# Patient Record
Sex: MALE | HISPANIC OR LATINO | URBAN - METROPOLITAN AREA
[De-identification: names, ages, dates, MRNs, and addresses within clinical notes are randomized per-mention and may not be internally consistent; named-entity substitution may affect disease eponyms.]

---

## 2023-09-16 ENCOUNTER — OFFICE VISIT (OUTPATIENT)
Dept: URGENT CARE | Facility: URGENT CARE | Age: 28
End: 2023-09-16
Payer: MEDICAID

## 2023-09-16 VITALS
TEMPERATURE: 98.8 F | WEIGHT: 129 LBS | HEART RATE: 125 BPM | OXYGEN SATURATION: 100 % | DIASTOLIC BLOOD PRESSURE: 86 MMHG | SYSTOLIC BLOOD PRESSURE: 145 MMHG

## 2023-09-16 DIAGNOSIS — R30.0 DYSURIA: Primary | ICD-10-CM

## 2023-09-16 DIAGNOSIS — K29.00 ACUTE GASTRITIS WITHOUT HEMORRHAGE, UNSPECIFIED GASTRITIS TYPE: ICD-10-CM

## 2023-09-16 LAB
ALBUMIN UR-MCNC: NEGATIVE MG/DL
APPEARANCE UR: CLEAR
BILIRUB UR QL STRIP: NEGATIVE
COLOR UR AUTO: YELLOW
GLUCOSE UR STRIP-MCNC: NEGATIVE MG/DL
HGB UR QL STRIP: NEGATIVE
KETONES UR STRIP-MCNC: NEGATIVE MG/DL
LEUKOCYTE ESTERASE UR QL STRIP: NEGATIVE
NITRATE UR QL: NEGATIVE
PH UR STRIP: 6.5 [PH] (ref 5–7)
SP GR UR STRIP: 1.01 (ref 1–1.03)
UROBILINOGEN UR STRIP-ACNC: 0.2 E.U./DL

## 2023-09-16 PROCEDURE — 81003 URINALYSIS AUTO W/O SCOPE: CPT

## 2023-09-16 PROCEDURE — 99203 OFFICE O/P NEW LOW 30 MIN: CPT

## 2023-09-16 NOTE — PROGRESS NOTES
Assessment & Plan     Acute gastritis without hemorrhage, unspecified gastritis type    - omeprazole (PRILOSEC) 20 MG DR capsule; Take 1 capsule (20 mg) by mouth 2 times daily for 30 days    Recommend trial of omeprazole to treat reflux sx.  Recommend avoiding spicy foods until this subsides.  Recommend establishing care with PCP.    Close Follow-up if no change or new or worsening sx prn.    Dysuria    - UA Macroscopic with reflex to Microscopic and Culture    Reassured UA was negative.    Shanice Almeida MD   Urgent Care Provider  [facility] URGENT CARE TIKI Ortiz is a 28 year old, presenting for the following health issues:  Urgent Care and Abdominal Pain (Abdominal pain, back pain that comes and goes. Shortness of breath and some chest pain. Hands cold and xavier.)      HPI     Presents with boyfriend.  Recently moved here from [place].    No PCP yet.  Increased midepigastric pain with spicy foods in past weeks.  Has had more burping with +acid taste left in mouth.  Has not tried anything to resolve this.  Has had some back pain that comes and goes.  No fever or chills.  No N/V.  No unexplained weight loss.  No cough.    Review of Systems   Constitutional, HEENT, cardiovascular, pulmonary, GI, , musculoskeletal, neuro, skin, endocrine and psych systems are negative, except as otherwise noted.      Objective    BP (!) 145/86   Pulse (!) 125   Temp 98.8  F (37.1  C) (Tympanic)   Wt 58.5 kg (129 lb)   SpO2 100%   There is no height or weight on file to calculate BMI.  Physical Exam   GENERAL: healthy, alert and no distress  EYES: Eyes grossly normal to inspection, PERRL and conjunctivae and sclerae normal  RESP: lungs clear to auscultation - no rales, rhonchi or wheezes  CV: regular rate and rhythm, normal S1 S2, no S3 or S4, no murmur, click or rub, no peripheral edema and peripheral pulses strong  ABDOMEN: soft, nontender, no hepatosplenomegaly, no masses and bowel sounds  normal  PSYCH: mentation appears normal, affect normal/bright

## 2023-09-18 ENCOUNTER — TELEPHONE (OUTPATIENT)
Dept: FAMILY MEDICINE | Facility: CLINIC | Age: 28
End: 2023-09-18

## 2023-09-18 ENCOUNTER — OFFICE VISIT (OUTPATIENT)
Dept: FAMILY MEDICINE | Facility: CLINIC | Age: 28
End: 2023-09-18
Payer: MEDICAID

## 2023-09-18 VITALS
SYSTOLIC BLOOD PRESSURE: 138 MMHG | HEIGHT: 63 IN | DIASTOLIC BLOOD PRESSURE: 82 MMHG | TEMPERATURE: 98.2 F | HEART RATE: 98 BPM | WEIGHT: 128 LBS | RESPIRATION RATE: 16 BRPM | BODY MASS INDEX: 22.68 KG/M2 | OXYGEN SATURATION: 100 %

## 2023-09-18 DIAGNOSIS — Z11.3 ROUTINE SCREENING FOR STI (SEXUALLY TRANSMITTED INFECTION): ICD-10-CM

## 2023-09-18 DIAGNOSIS — Z21 POSITIVE LABORATORY TESTING FOR HUMAN IMMUNODEFICIENCY VIRUS (H): Primary | ICD-10-CM

## 2023-09-18 DIAGNOSIS — Z13.220 LIPID SCREENING: ICD-10-CM

## 2023-09-18 DIAGNOSIS — F41.1 GAD (GENERALIZED ANXIETY DISORDER): Primary | ICD-10-CM

## 2023-09-18 DIAGNOSIS — F41.9 ANXIETY: ICD-10-CM

## 2023-09-18 DIAGNOSIS — F19.91 HISTORY OF DRUG USE: ICD-10-CM

## 2023-09-18 DIAGNOSIS — Z13.29 SCREENING FOR THYROID DISORDER: ICD-10-CM

## 2023-09-18 DIAGNOSIS — M99.09 SOMATIC DYSFUNCTION OF ABDOMINAL REGION: ICD-10-CM

## 2023-09-18 LAB
ALBUMIN SERPL BCG-MCNC: 4.9 G/DL (ref 3.5–5.2)
ALP SERPL-CCNC: 63 U/L (ref 40–129)
ALT SERPL W P-5'-P-CCNC: 18 U/L (ref 0–70)
ANION GAP SERPL CALCULATED.3IONS-SCNC: 13 MMOL/L (ref 7–15)
AST SERPL W P-5'-P-CCNC: 20 U/L (ref 0–45)
BASOPHILS # BLD AUTO: 0 10E3/UL (ref 0–0.2)
BASOPHILS NFR BLD AUTO: 0 %
BILIRUB SERPL-MCNC: 0.7 MG/DL
BUN SERPL-MCNC: 7.4 MG/DL (ref 6–20)
CALCIUM SERPL-MCNC: 9.8 MG/DL (ref 8.6–10)
CHLORIDE SERPL-SCNC: 105 MMOL/L (ref 98–107)
CHOLEST SERPL-MCNC: 141 MG/DL
CREAT SERPL-MCNC: 0.8 MG/DL (ref 0.67–1.17)
DEPRECATED HCO3 PLAS-SCNC: 22 MMOL/L (ref 22–29)
EGFRCR SERPLBLD CKD-EPI 2021: >90 ML/MIN/1.73M2
EOSINOPHIL # BLD AUTO: 0 10E3/UL (ref 0–0.7)
EOSINOPHIL NFR BLD AUTO: 0 %
ERYTHROCYTE [DISTWIDTH] IN BLOOD BY AUTOMATED COUNT: 12.6 % (ref 10–15)
GLUCOSE SERPL-MCNC: 104 MG/DL (ref 70–99)
HCT VFR BLD AUTO: 45.5 % (ref 40–53)
HDLC SERPL-MCNC: 40 MG/DL
HGB BLD-MCNC: 15.9 G/DL (ref 13.3–17.7)
IMM GRANULOCYTES # BLD: 0 10E3/UL
IMM GRANULOCYTES NFR BLD: 0 %
LDLC SERPL CALC-MCNC: 82 MG/DL
LIPASE SERPL-CCNC: 22 U/L (ref 13–60)
LYMPHOCYTES # BLD AUTO: 2.4 10E3/UL (ref 0.8–5.3)
LYMPHOCYTES NFR BLD AUTO: 44 %
MCH RBC QN AUTO: 30.5 PG (ref 26.5–33)
MCHC RBC AUTO-ENTMCNC: 34.9 G/DL (ref 31.5–36.5)
MCV RBC AUTO: 87 FL (ref 78–100)
MONOCYTES # BLD AUTO: 0.5 10E3/UL (ref 0–1.3)
MONOCYTES NFR BLD AUTO: 8 %
NEUTROPHILS # BLD AUTO: 2.7 10E3/UL (ref 1.6–8.3)
NEUTROPHILS NFR BLD AUTO: 48 %
NONHDLC SERPL-MCNC: 101 MG/DL
PLATELET # BLD AUTO: 187 10E3/UL (ref 150–450)
POTASSIUM SERPL-SCNC: 3.6 MMOL/L (ref 3.4–5.3)
PROT SERPL-MCNC: 9.1 G/DL (ref 6.4–8.3)
RBC # BLD AUTO: 5.21 10E6/UL (ref 4.4–5.9)
SODIUM SERPL-SCNC: 140 MMOL/L (ref 136–145)
TRIGL SERPL-MCNC: 95 MG/DL
TSH SERPL DL<=0.005 MIU/L-ACNC: 1.39 UIU/ML (ref 0.3–4.2)
WBC # BLD AUTO: 5.6 10E3/UL (ref 4–11)

## 2023-09-18 PROCEDURE — 83690 ASSAY OF LIPASE: CPT | Performed by: FAMILY MEDICINE

## 2023-09-18 PROCEDURE — 87491 CHLMYD TRACH DNA AMP PROBE: CPT | Performed by: FAMILY MEDICINE

## 2023-09-18 PROCEDURE — 86803 HEPATITIS C AB TEST: CPT | Performed by: FAMILY MEDICINE

## 2023-09-18 PROCEDURE — 36415 COLL VENOUS BLD VENIPUNCTURE: CPT | Performed by: FAMILY MEDICINE

## 2023-09-18 PROCEDURE — 80053 COMPREHEN METABOLIC PANEL: CPT | Performed by: FAMILY MEDICINE

## 2023-09-18 PROCEDURE — 80061 LIPID PANEL: CPT | Performed by: FAMILY MEDICINE

## 2023-09-18 PROCEDURE — 86780 TREPONEMA PALLIDUM: CPT | Performed by: FAMILY MEDICINE

## 2023-09-18 PROCEDURE — 99215 OFFICE O/P EST HI 40 MIN: CPT | Performed by: FAMILY MEDICINE

## 2023-09-18 PROCEDURE — 85025 COMPLETE CBC W/AUTO DIFF WBC: CPT | Performed by: FAMILY MEDICINE

## 2023-09-18 PROCEDURE — 84443 ASSAY THYROID STIM HORMONE: CPT | Performed by: FAMILY MEDICINE

## 2023-09-18 PROCEDURE — 87591 N.GONORRHOEAE DNA AMP PROB: CPT | Performed by: FAMILY MEDICINE

## 2023-09-18 RX ORDER — BUSPIRONE HYDROCHLORIDE 10 MG/1
10 TABLET ORAL 2 TIMES DAILY
Qty: 60 TABLET | Refills: 1 | Status: SHIPPED | OUTPATIENT
Start: 2023-09-18 | End: 2023-10-31

## 2023-09-18 ASSESSMENT — PATIENT HEALTH QUESTIONNAIRE - PHQ9
5. POOR APPETITE OR OVEREATING: MORE THAN HALF THE DAYS
SUM OF ALL RESPONSES TO PHQ QUESTIONS 1-9: 15

## 2023-09-18 ASSESSMENT — PAIN SCALES - GENERAL: PAINLEVEL: NO PAIN (0)

## 2023-09-18 ASSESSMENT — ANXIETY QUESTIONNAIRES
6. BECOMING EASILY ANNOYED OR IRRITABLE: MORE THAN HALF THE DAYS
1. FEELING NERVOUS, ANXIOUS, OR ON EDGE: NEARLY EVERY DAY
5. BEING SO RESTLESS THAT IT IS HARD TO SIT STILL: MORE THAN HALF THE DAYS
IF YOU CHECKED OFF ANY PROBLEMS ON THIS QUESTIONNAIRE, HOW DIFFICULT HAVE THESE PROBLEMS MADE IT FOR YOU TO DO YOUR WORK, TAKE CARE OF THINGS AT HOME, OR GET ALONG WITH OTHER PEOPLE: SOMEWHAT DIFFICULT
2. NOT BEING ABLE TO STOP OR CONTROL WORRYING: MORE THAN HALF THE DAYS
3. WORRYING TOO MUCH ABOUT DIFFERENT THINGS: MORE THAN HALF THE DAYS
GAD7 TOTAL SCORE: 15
GAD7 TOTAL SCORE: 15
7. FEELING AFRAID AS IF SOMETHING AWFUL MIGHT HAPPEN: MORE THAN HALF THE DAYS

## 2023-09-18 NOTE — TELEPHONE ENCOUNTER
Davon Wilson, DO [facility] [employer]  Please change his physical appoint with me, he did want to establish care with me and schedulers were not able to find in person appoint        Copied from CC chart.    GALDINO Larson  [facility]

## 2023-09-18 NOTE — TELEPHONE ENCOUNTER
Called patient and left a detailed voicemail message that I was calling to help patient get scheduled his physical with Dr Wilson.  Told patient I would call him back tomorrow to try and schedule.    GALDINO Larson  [facility]

## 2023-09-18 NOTE — Clinical Note
Please change his physical appoint with me, he did want to establish care with me and schedulers were not able to find in person appoint

## 2023-09-18 NOTE — PROGRESS NOTES
1. HUGO (generalized anxiety disorder)  ED follow up , New patient to this clinic and provider.  History of anxiety since teenage years.  Patient has never been medicated and is afraid to be on a daily medication.  Strongly advised therapy.  Avoid any drug use.  He does well on a medication that he can use potentially short term.  We discussed risks benefits of BuSpar.  He is to start medication and follow-up in a month  - busPIRone (BUSPAR) 10 MG tablet; Take 1 tablet (10 mg) by mouth 2 times daily  Dispense: 60 tablet; Refill: 1  - CBC with platelets and differential; Future  - TSH with free T4 reflex; Future  - CBC with platelets and differential  - TSH with free T4 reflex    2. Anxiety  As above  - Adult Mental Health  Referral; Future  - busPIRone (BUSPAR) 10 MG tablet; Take 1 tablet (10 mg) by mouth 2 times daily  Dispense: 60 tablet; Refill: 1    3. Lipid screening    - Lipid panel reflex to direct LDL Fasting; Future  - Lipid panel reflex to direct LDL Fasting    4. Routine screening for STI (sexually transmitted infection)    - Treponema Abs w Reflex to RPR and Titer; Future  - Neisseria gonorrhoeae PCR; Future  - Chlamydia trachomatis PCR; Future  - Treponema Abs w Reflex to RPR and Titer  - Neisseria gonorrhoeae PCR  - Chlamydia trachomatis PCR    5. Screening for thyroid disorder    - TSH with free T4 reflex; Future  - TSH with free T4 reflex    6. Somatic dysfunction of abdominal region  Patient was seen in urgent care however no labs were drawn.  Today his symptoms are slightly better.  Continue PPI for now.  Check labs and follow-up in a month.  - CBC with platelets and differential; Future  - Hepatitis C Screen Reflex to HCV RNA Quant and Genotype; Future  - Comprehensive metabolic panel (BMP + Alb, Alk Phos, ALT, AST, Total. Bili, TP); Future  - Lipase; Future  - CBC with platelets and differential  - Hepatitis C Screen Reflex to HCV RNA Quant and Genotype  - Comprehensive metabolic panel  "(BMP + Alb, Alk Phos, ALT, AST, Total. Bili, TP)  - Lipase      Spent  60min greater than 50% of counseling and coordination of care for the conditions documented above.    Sameer Ortiz is a 28 year old, presenting for the following health issues:  Follow Up (Urgent care)      [facility]     ED/UC Followup:    Facility:  [place] urgent [facility]  Date of visit: 9/16/23  Reason for visit: dysuria  Current Status:   Feeling better as far as GI symptoms. Taking omeprazole  Head issue is ongoing and likely related to anxiety.   He states he was not fully honest with urgent care.   He tried a drug in June and it has been affecting him since.   Anxiety is High.   Abdominal pains triggered by anxiety.  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      Objective    /82   Pulse 98   Temp 98.2  F (36.8  C) (Oral)   Resp 16   Ht 1.6 m (5' 3\")   Wt 58.1 kg (128 lb)   SpO2 100%   BMI 22.67 kg/m    Body mass index is 22.67 kg/m .  Physical Exam   GENERAL: healthy, alert and no distress  EYES: Eyes grossly normal to inspection, PERRL and conjunctivae and sclerae normal  HENT: ear canals and TM's normal, nose and mouth without ulcers or lesions  NECK: no adenopathy, no asymmetry, masses, or scars and thyroid normal to palpation  RESP: lungs clear to auscultation - no rales, rhonchi or wheezes  CV: regular rate and rhythm, normal S1 S2, no S3 or S4, no murmur, click or rub, no peripheral edema and peripheral pulses strong  ABDOMEN: soft, nontender, no hepatosplenomegaly, no masses and bowel sounds normal  MS: no gross musculoskeletal defects noted, no edema              "

## 2023-09-18 NOTE — PATIENT INSTRUCTIONS
Therapist   Start buspar 1/2 stephanie 2 times a day   Follow up as planned in a month or so  Make appnormantn now for preventive visit and follow  Davon Wilson D.O.

## 2023-09-19 LAB
C TRACH DNA SPEC QL NAA+PROBE: NEGATIVE
N GONORRHOEA DNA SPEC QL NAA+PROBE: NEGATIVE
T PALLIDUM AB SER QL: NONREACTIVE

## 2023-09-19 NOTE — TELEPHONE ENCOUNTER
Called patient and left a detailed voicemail message that I was calling to help patient get scheduled his physical with Dr Wilson.  Told patient I would call him back tomorrow to try and schedule.    GALDINO Larson  [facility]

## 2023-09-20 LAB — HCV AB SERPL QL IA: NONREACTIVE

## 2023-10-10 ENCOUNTER — VIRTUAL VISIT (OUTPATIENT)
Dept: FAMILY MEDICINE | Facility: CLINIC | Age: 28
End: 2023-10-10
Payer: MEDICAID

## 2023-10-10 DIAGNOSIS — K29.00 ACUTE GASTRITIS WITHOUT HEMORRHAGE, UNSPECIFIED GASTRITIS TYPE: ICD-10-CM

## 2023-10-10 DIAGNOSIS — G47.00 INSOMNIA, UNSPECIFIED TYPE: ICD-10-CM

## 2023-10-10 DIAGNOSIS — Z11.4 SCREENING FOR HIV (HUMAN IMMUNODEFICIENCY VIRUS): ICD-10-CM

## 2023-10-10 DIAGNOSIS — F41.1 GAD (GENERALIZED ANXIETY DISORDER): Primary | ICD-10-CM

## 2023-10-10 DIAGNOSIS — R12 HEARTBURN: ICD-10-CM

## 2023-10-10 DIAGNOSIS — R73.9 ELEVATED BLOOD SUGAR: ICD-10-CM

## 2023-10-10 PROCEDURE — 99214 OFFICE O/P EST MOD 30 MIN: CPT | Mod: VID | Performed by: FAMILY MEDICINE

## 2023-10-10 RX ORDER — HYDROXYZINE PAMOATE 25 MG/1
25 CAPSULE ORAL 3 TIMES DAILY PRN
Qty: 30 CAPSULE | Refills: 1 | Status: SHIPPED | OUTPATIENT
Start: 2023-10-10 | End: 2023-10-31

## 2023-10-10 ASSESSMENT — ANXIETY QUESTIONNAIRES
IF YOU CHECKED OFF ANY PROBLEMS ON THIS QUESTIONNAIRE, HOW DIFFICULT HAVE THESE PROBLEMS MADE IT FOR YOU TO DO YOUR WORK, TAKE CARE OF THINGS AT HOME, OR GET ALONG WITH OTHER PEOPLE: SOMEWHAT DIFFICULT
5. BEING SO RESTLESS THAT IT IS HARD TO SIT STILL: SEVERAL DAYS
2. NOT BEING ABLE TO STOP OR CONTROL WORRYING: SEVERAL DAYS
GAD7 TOTAL SCORE: 7
GAD7 TOTAL SCORE: 7
3. WORRYING TOO MUCH ABOUT DIFFERENT THINGS: SEVERAL DAYS
1. FEELING NERVOUS, ANXIOUS, OR ON EDGE: SEVERAL DAYS
7. FEELING AFRAID AS IF SOMETHING AWFUL MIGHT HAPPEN: SEVERAL DAYS
4. TROUBLE RELAXING: SEVERAL DAYS
6. BECOMING EASILY ANNOYED OR IRRITABLE: SEVERAL DAYS

## 2023-10-10 ASSESSMENT — PATIENT HEALTH QUESTIONNAIRE - PHQ9
10. IF YOU CHECKED OFF ANY PROBLEMS, HOW DIFFICULT HAVE THESE PROBLEMS MADE IT FOR YOU TO DO YOUR WORK, TAKE CARE OF THINGS AT HOME, OR GET ALONG WITH OTHER PEOPLE: SOMEWHAT DIFFICULT
SUM OF ALL RESPONSES TO PHQ QUESTIONS 1-9: 7
SUM OF ALL RESPONSES TO PHQ QUESTIONS 1-9: 7

## 2023-10-10 NOTE — COMMUNITY RESOURCES LIST (ENGLISH)
10/10/2023   [facility]  N/A  For questions about this resource list or additional care needs, please contact your primary care clinic or care manager.  Phone: [phone]   Email: N/A   Address: [address] [place] [address]   Hours: N/A        Food and Nutrition       Food pantry  1  [employer] Distance: 1.61 miles      In-Person   [address] [place] [address]  Language: English, Bahamian  Hours: Mon 10:00 AM - 6:00 PM , Tue 9:00 PM - 5:00 PM , Thu 11:00 PM - 7:00 PM , Sat 9:00 AM - 2:00 PM  Fees: Free   Phone: (115) 190-7361 Email: info@Pinwine.cn.org Website: http://www.RIVA Groupf.org/     2  [place] [facility] (STEP) Distance: 1.79 miles      Delivery, Pickup   [address] [place] [address]  Language: English, Uzbek, Bahamian  Hours: Mon 8:00 AM - 3:30 PM , Tue 12:00 PM - 7:00 PM , Wed - Thu 8:00 AM - 3:30 PM , Fri 8:00 AM - 11:30 PM  Fees: Free   Phone: (682) 404-3851 Email: info@STEPslp.org Website: http://stepslp.org/     SNAP application assistance  3  [facility] Distance: 2.94 miles      In-Person, Phone/Virtual   [address] [place] [address]  Language: English, Turkmen, Zhao  Hours: Mon - Fri 9:00 AM - 5:00 PM  Fees: Free   Phone: (991) 972-2277 Email: fabiola@Storific.BioMax Website: https://www.Jericho Ventures.com/NeoPhotonics.org/     4  [facility] [employer] [employer] (CLUES) [facility] Distance: 3.18 miles      In-Person   [address] [place] [address]  Language: English, Bahamian  Hours: Mon - Fri 8:30 AM - 5:00 PM  Fees: Free   Phone: (527) 871-7852 Email: info@clues.org Website: http://www.clues.org/     Soup kitchen or free meals  5  [facility] [employer] [facility] - [facility] Kitchen Distance: 1.59 miles      In-Person   [address] [place] [address]  Language: English  Hours: Mon - Fri 3:00  PM - 9:00 PM , Sat 12:00 PM - 6:00 PM  Fees: Free   Phone: (435) 602-7769 Email: jeferson@St[facility] Mary's Medical CenterBetKlub Website: https://www.DurhamTrice Orthopedics/parks__destinations/recreation_centers/[place]_recreation_center/     6  [facility] - [facility] - Loaves and [facility] Distance: 2.44 miles      In-Person   [address] [place] [address]  Language: English, Libyan, German  Hours: Mon - Fri 12:00 PM - 1:00 PM  Fees: Free   Phone: (903) 281-2484 Email: info@Staxxon.[employer]on [employer]aceuticals Website: http://www.fring LtdSetMeUp/locations/mabel_kaitlynn          Important Numbers & Websites       Emergency Services   911  [facility] Services   311  Poison Control   (842) 557-8553  Suicide Prevention Lifeline   (620) 583-4436 (TALK)  Child Abuse Hotline   (290) 932-9484 (4-A-Child)  Sexual Assault Hotline   (624) 851-6300 (HOPE)  National Runaway Safeline   (669) 403-1355 (RUNAWAY)  All-Options Talkline   (356) 696-5526  Substance Abuse Referral   (242) 343-3055 (HELP)

## 2023-10-10 NOTE — COMMUNITY RESOURCES LIST (ENGLISH)
10/10/2023   [facility]  N/A  For questions about this resource list or additional care needs, please contact your primary care clinic or care manager.  Phone: [phone]   Email: N/A   Address: [address] [place] [address]   Hours: N/A        Food and Nutrition       Food pantry  1  [employer] Distance: 1.61 miles      In-Person   [address] [place] [address]  Language: English, Filipino  Hours: Mon 10:00 AM - 6:00 PM , Tue 9:00 PM - 5:00 PM , Thu 11:00 PM - 7:00 PM , Sat 9:00 AM - 2:00 PM  Fees: Free   Phone: (730) 939-1096 Email: info@Lithotri[facility] [place].org Website: http://www.Meriton Networksf.org/     2  [place] [facility] (STEP) Distance: 1.79 miles      Delivery, Pickup   [address] [place] [address]  Language: English, Yemeni, Filipino  Hours: Mon 8:00 AM - 3:30 PM , Tue 12:00 PM - 7:00 PM , Wed - Thu 8:00 AM - 3:30 PM , Fri 8:00 AM - 11:30 PM  Fees: Free   Phone: (561) 142-2705 Email: info@STEPslp.org Website: http://stepslp.org/     SNAP application assistance  3  [facility] Distance: 2.94 miles      In-Person, Phone/Virtual   [address] [place] [address]  Language: English, Malawian, Zhao  Hours: Mon - Fri 9:00 AM - 5:00 PM  Fees: Free   Phone: (978) 135-2831 Email: fabiola@[employer]otek Motion Technologies.[employer] Falcon Development Website: https://www.Rhytec.com/Ensighten.org/     4  Comunidades Latinas Unidas En Servicio (CLUES) [facility] Distance: 3.18 miles      In-Person   [address] [place] [address]  Language: English, Filipino  Hours: Mon - Fri 8:30 AM - 5:00 PM  Fees: Free   Phone: (210) 226-6609 Email: info@clues.org Website: http://www.clues.org/     Soup kitchen or free meals  5  [facility] Bioparaiso [facility] - [facility] - [facility] Kitchen Distance: 1.59 miles      In-Person   [address] [place] [address]  Language: English  Hours: Mon - Fri 3:00  PM - 9:00 PM , Sat 12:00 PM - 6:00 PM  Fees: Free   Phone: (440) 731-4901 Email: jeferson@Cumberland Medical CenterAvvasi Inc. Website: https://www.GomerSignal Patterns/parks__destinations/recreation_centers/[place]_recreation_center/     6  [facility] - [facility]abel [facility] - Loaves and [facility] Distance: 2.44 miles      In-Person   [address] [place] [address]  Language: English, Tajik, Nigerian  Hours: Mon - Fri 12:00 PM - 1:00 PM  Fees: Free   Phone: (242) 244-6607 Email: info@Insiders@ Project.Reconnex Website: http://www.PersonSpotShoprocket/locations/mabel_kaitlynn          Important Numbers & Websites       Emergency Services   911  [facility] Services   311  Poison Control   (927) 132-8675  Suicide Prevention Lifeline   (391) 333-3733 (TALK)  Child Abuse Hotline   (887) 494-9108 (4-A-Child)  Sexual Assault Hotline   (256) 634-9318 (HOPE)  National Runaway Safeline   (551) 836-5045 (RUNAWAY)  All-Options Talkline   (213) 101-9794  Substance Abuse Referral   (959) 282-5921 (HELP)

## 2023-10-10 NOTE — COMMUNITY RESOURCES LIST (ENGLISH)
10/10/2023   [facility]  N/A  For questions about this resource list or additional care needs, please contact your primary care clinic or care manager.  Phone: [phone]   Email: N/A   Address: [address] [place] [address]   Hours: N/A        Food and Nutrition       Food pantry  1  [employer] Distance: 1.61 miles      In-Person   [address] [place] [address]  Language: English, Haitian  Hours: Mon 10:00 AM - 6:00 PM , Tue 9:00 PM - 5:00 PM , Thu 11:00 PM - 7:00 PM , Sat 9:00 AM - 2:00 PM  Fees: Free   Phone: (127) 283-5752 Email: info@[employer].org Website: http://www.KitLocatef.org/     2  [place] [facility] (STEP) Distance: 1.79 miles      Delivery, Pickup   [address] [place] [address]  Language: English, Chilean, Haitian  Hours: Mon 8:00 AM - 3:30 PM , Tue 12:00 PM - 7:00 PM , Wed - Thu 8:00 AM - 3:30 PM , Fri 8:00 AM - 11:30 PM  Fees: Free   Phone: (740) 470-4626 Email: info@STEPslp.org Website: http://stepslp.org/     SNAP application assistance  3  [facility] Distance: 2.94 miles      In-Person, Phone/Virtual   [address] [place] [address]  Language: English, Samoan, Zhao  Hours: Mon - Fri 9:00 AM - 5:00 PM  Fees: Free   Phone: (369) 695-3111 Email: fabiola@Chaikin Stock Research.Vend-a-Bar Website: https://www.SenseHere Technology.com/NanoSteel.org/     4  Comunidades Latinas Unidas En Servicio (CLUES) [facility] Distance: 3.18 miles      In-Person   [address] [place] [address]  Language: English, Haitian  Hours: Mon - Fri 8:30 AM - 5:00 PM  Fees: Free   Phone: (180) 444-2157 Email: info@clues.org Website: http://www.clues.org/     Soup kitchen or free meals  5  [facility] ADEA [employer]ters [facility] - [facility] - [facility] Kitchen Distance: 1.59 miles      In-Person   [address] [place] [address]  Language: English  Hours: Mon - Fri 3:00  PM - 9:00 PM , Sat 12:00 PM - 6:00 PM  Fees: Free   Phone: (571) 322-9128 Email: jeferson@[facility] Hospital at VanderbiltMotorpaneer Website: https://www.HarveyGlobeTrotr.com/parks__destinations/recreation_centers/[place]_recreation_center/     6  [facility] - Mabel [facility] - Loaves and [facility] Distance: 2.44 miles      In-Person   [address] [place] [address]  Language: English, Spanish, Palestinian  Hours: Mon - Fri 12:00 PM - 1:00 PM  Fees: Free   Phone: (906) 961-3528 Email: info@Cradle Technologies.Neos Therapeutics Website: http://www.BioSeek23andMe/locations/mabel_kaitlynn          Important Numbers & Websites       Emergency Services   911  [facility] Services   311  Poison Control   (691) 301-1737  Suicide Prevention Lifeline   (376) 395-6996 (TALK)  Child Abuse Hotline   (201) 589-9654 (4-A-Child)  Sexual Assault Hotline   (513) 877-1084 (HOPE)  National Runaway Safeline   (187) 139-8609 (RUNAWAY)  All-Options Talkline   (785) 852-2858  Substance Abuse Referral   (246) 908-4365 (HELP)

## 2023-10-10 NOTE — PROGRESS NOTES
Angel is a 28 year old who is being evaluated via a billable video visit.      How would you like to obtain your AVS? MyChart  If the video visit is dropped, the invitation should be resent by: Text to cell phone: [phone]  Will anyone else be joining your video visit? No          1. HUGO (generalized anxiety disorder)  Much better with therapy, he did try buspar but had headaches from med, will try hydroxyzine prn, he does not want daily meds  - hydrOXYzine (VISTARIL) 25 MG capsule; Take 1 capsule (25 mg) by mouth 3 times daily as needed for itching  Dispense: 30 capsule; Refill: 1    2. Elevated blood sugar  Check labs, family history of dm  - Hemoglobin A1c; Future    3. Screening for HIV (human immunodeficiency virus)    - HIV Antigen Antibody Combo; Future  - Hepatitis B surface antigen; Future    4. Insomnia, unspecified type  Trial of med  - hydrOXYzine (VISTARIL) 25 MG capsule; Take 1 capsule (25 mg) by mouth 3 times daily as needed for itching  Dispense: 30 capsule; Refill: 1    5. Heartburn  Improving but at times gets some upper chest issues , no pain with activty, ? From anxiety or heartburn.  Advised close monitoring, dietary changes advisd    Follow up omar 2-3 weeks      Subjective   Angel is a 28 year old, presenting for the following health issues:  Follow Up        10/10/2023     6:23 AM   Additional Questions   Roomed by Kerrie       Anxiety better with therapy, he does use Buspar and anxiety is much better now with therapy    Gives him a headache  He does want to come off meds  No headaches without medication    Reviewed labs             Review of Systems   Constitutional, HEENT, cardiovascular, pulmonary, GI, , musculoskeletal, neuro, skin, endocrine and psych systems are negative, except as otherwise noted.      Objective           Vitals:  No vitals were obtained today due to virtual visit.    Physical Exam   GENERAL: Healthy, alert and no distress  EYES: Eyes grossly normal to  inspection.  No discharge or erythema, or obvious scleral/conjunctival abnormalities.  RESP: No audible wheeze, cough, or visible cyanosis.  No visible retractions or increased work of breathing.    SKIN: Visible skin clear. No significant rash, abnormal pigmentation or lesions.  NEURO: Cranial nerves grossly intact.  Mentation and speech appropriate for age.  PSYCH: Mentation appears normal, affect normal/bright, judgement and insight intact, normal speech and appearance well-groomed.              Video-Visit Details    Type of service:  Video Visit   Video Start Time: 7:03am  Video End Time:7:26aM    Originating Location (pt. Location): Home    Distant Location (provider location):  On-site  Platform used for Video Visit: Digital Chocolate

## 2023-10-12 ENCOUNTER — TELEPHONE (OUTPATIENT)
Dept: FAMILY MEDICINE | Facility: CLINIC | Age: 28
End: 2023-10-12

## 2023-10-12 ENCOUNTER — VIRTUAL VISIT (OUTPATIENT)
Dept: FAMILY MEDICINE | Facility: CLINIC | Age: 28
End: 2023-10-12
Payer: MEDICAID

## 2023-10-12 DIAGNOSIS — F33.0 MILD EPISODE OF RECURRENT MAJOR DEPRESSIVE DISORDER (H): ICD-10-CM

## 2023-10-12 DIAGNOSIS — F41.1 GAD (GENERALIZED ANXIETY DISORDER): Primary | ICD-10-CM

## 2023-10-12 PROCEDURE — 99214 OFFICE O/P EST MOD 30 MIN: CPT | Mod: VID | Performed by: NURSE PRACTITIONER

## 2023-10-12 RX ORDER — ESCITALOPRAM OXALATE 5 MG/1
5 TABLET ORAL DAILY
Qty: 30 TABLET | Refills: 0 | Status: SHIPPED | OUTPATIENT
Start: 2023-10-12 | End: 2023-11-14

## 2023-10-12 ASSESSMENT — ANXIETY QUESTIONNAIRES
2. NOT BEING ABLE TO STOP OR CONTROL WORRYING: MORE THAN HALF THE DAYS
IF YOU CHECKED OFF ANY PROBLEMS ON THIS QUESTIONNAIRE, HOW DIFFICULT HAVE THESE PROBLEMS MADE IT FOR YOU TO DO YOUR WORK, TAKE CARE OF THINGS AT HOME, OR GET ALONG WITH OTHER PEOPLE: VERY DIFFICULT
GAD7 TOTAL SCORE: 15
5. BEING SO RESTLESS THAT IT IS HARD TO SIT STILL: MORE THAN HALF THE DAYS
3. WORRYING TOO MUCH ABOUT DIFFERENT THINGS: NEARLY EVERY DAY
6. BECOMING EASILY ANNOYED OR IRRITABLE: MORE THAN HALF THE DAYS
7. FEELING AFRAID AS IF SOMETHING AWFUL MIGHT HAPPEN: SEVERAL DAYS
1. FEELING NERVOUS, ANXIOUS, OR ON EDGE: NEARLY EVERY DAY
GAD7 TOTAL SCORE: 15

## 2023-10-12 ASSESSMENT — ENCOUNTER SYMPTOMS
NERVOUS/ANXIOUS: 1
SLEEP DISTURBANCE: 1

## 2023-10-12 ASSESSMENT — PATIENT HEALTH QUESTIONNAIRE - PHQ9
5. POOR APPETITE OR OVEREATING: MORE THAN HALF THE DAYS
SUM OF ALL RESPONSES TO PHQ QUESTIONS 1-9: 16

## 2023-10-12 NOTE — LETTER
October 12, 2023      Angel Simeon  [address]    [facility] [address]        To Whom It May Concern:    Angel Simeon was seen on 10/12/23. Please excuse him today due to illness.        Sincerely,        Vero Zepeda, DNP

## 2023-10-12 NOTE — PROGRESS NOTES
Angel is a 28 year old who is being evaluated via a billable video visit.      How would you like to obtain your AVS? MyChart  If the video visit is dropped, the invitation should be resent by: Text to cell phone: [phone]  Will anyone else be joining your video visit? No          Assessment & Plan     HUGO (generalized anxiety disorder)  Chronic. He still isn't doing well on just PRN medication and counseling. He is now willing to try daily dosing. He does well with remembering daily medications. Lexapro prescribed; starting low dose as he doesn't like taking medications. Side effects, risks and benefits of medication were discussed with patient. Discussed how and when to take medication. Continue hydroxyzine PRN for breakthrough anxiety. Note written for work today. He is considering FMLA and will bring paperwork in to PCP for his appt on 10/31/23.     - escitalopram (LEXAPRO) 5 MG tablet; Take 1 tablet (5 mg) by mouth daily    Mild episode of recurrent major depressive disorder (H24)  See above.     - escitalopram (LEXAPRO) 5 MG tablet; Take 1 tablet (5 mg) by mouth daily    Prescription drug management         Depression Screening Follow Up        10/12/2023     2:22 PM   PHQ   PHQ-9 Total Score 16   Q9: Thoughts of better off dead/self-harm past 2 weeks Not at all         Follow Up Actions Taken  Crisis resource information provided in After Visit Summary     There are no Patient Instructions on file for this visit.    [facility]ep[facility]   Angel is a 28 year old, presenting for the following health issues:  Anxiety        10/12/2023     2:20 PM   Additional Questions   Roomed by Farida   Accompanied by none         10/12/2023     2:20 PM   Patient Reported Additional Medications   Patient reports taking the following new medications none       HPI     Depression and Anxiety Follow-Up  How are you doing with your depression since your last visit? Worsened    How are you doing with your anxiety since your last visit?  Worsened   Are you having other symptoms that might be associated with depression or anxiety? No  Have you had a significant life event? OTHER: moved, did some bad drugs in  which started things going badly for hin     Do you have any concerns with your use of alcohol or other drugs? No    Social History     Tobacco Use    Smoking status: Former     Years: 1     Types: Cigarettes     Quit date: 2023     Years since quittin.2    Smokeless tobacco: Never    Tobacco comments:     Did vaping for 2 years    Vaping Use    Vaping Use: Former    Quit date: 2023    Substances: THC, Flavoring    Devices: Disposable         2023    10:55 AM 10/10/2023     6:41 AM   PHQ   PHQ-9 Total Score 15 7   Q9: Thoughts of better off dead/self-harm past 2 weeks Several days Not at all         2023    10:55 AM 10/10/2023     6:42 AM   HUGO-7 SCORE   Total Score  7 (mild anxiety)   Total Score 15 7         10/10/2023     6:41 AM   Last PHQ-9   1.  Little interest or pleasure in doing things 1   2.  Feeling down, depressed, or hopeless 1   3.  Trouble falling or staying asleep, or sleeping too much 2   4.  Feeling tired or having little energy 1   5.  Poor appetite or overeating 0   6.  Feeling bad about yourself 1   7.  Trouble concentrating 0   8.  Moving slowly or restless 1   Q9: Thoughts of better off dead/self-harm past 2 weeks 0   PHQ-9 Total Score 7         10/10/2023     6:42 AM   HUGO-7    1. Feeling nervous, anxious, or on edge 1   2. Not being able to stop or control worrying 1   3. Worrying too much about different things 1   4. Trouble relaxing 1   5. Being so restless that it is hard to sit still 1   6. Becoming easily annoyed or irritable 1   7. Feeling afraid, as if something awful might happen 1   HUGO-7 Total Score 7   If you checked any problems, how difficult have they made it for you to do your work, take care of things at home, or get  "along with other people? Somewhat difficult       Suicide Assessment Five-step Evaluation and Treatment (SAFE-T)      Additional provider notes: Patient presents virtually via video visit for anxiety follow-up. He was just seen virtually with his PCP on 10/10/23 for anxiety. He was improving with counseling, but stopped buspar due to HA prior to that appointment. He was started on hydroxyzine PRN for anxiety as he did not want to take daily medications.     Earlier today he left work early due to feeling out of body experience or that he wasn't totally present. He thinks it was possibly a panic attack. Denies suicidal ideation. He doesn't feel like he slept well last night. States it comes in waves. States he is feeling better now during appointment, but is concerned as it comes and goes and he feels it can be debilitating.     He has been doing therapy which has been helpful.     Hx of substance abuse and doesn't want to be on medications. But is willing to try daily medications if it will help him.     No Known Allergies    Current Outpatient Medications   Medication    busPIRone (BUSPAR) 10 MG tablet    hydrOXYzine (VISTARIL) 25 MG capsule    omeprazole (PRILOSEC) 20 MG DR capsule     No current facility-administered medications for this visit.       No past medical history on file.         Review of Systems   Psychiatric/Behavioral:  Positive for mood changes and sleep disturbance. Negative for self-injury and suicidal ideas. The patient is nervous/anxious.    All other systems reviewed and are negative.           Objective    Vitals - Patient Reported  Weight (Patient Reported): 59 kg (130 lb)  Height (Patient Reported): 160 cm (5' 3\")  BMI (Based on Pt Reported Ht/Wt): 23.03  Pain Score: No Pain (0)        Physical Exam   GENERAL: Healthy, alert and no distress  EYES: Eyes grossly normal to inspection.  No discharge or erythema, or obvious scleral/conjunctival abnormalities.  RESP: No audible wheeze, cough, or " visible cyanosis.  No visible retractions or increased work of breathing.    SKIN: Visible skin clear. No significant rash, abnormal pigmentation or lesions.  NEURO: Cranial nerves grossly intact.  Mentation and speech appropriate for age.  PSYCH: Mentation appears normal, affect normal/bright, judgement and insight intact, normal speech and appearance well-groomed.                Video-Visit Details    Type of service:  Video Visit   Video Start Time: 3:00 PM  Video End Time:3:13 PM    Originating Location (pt. Location): Home    Distant Location (provider location):  Off-site  Platform used for Video Visit: Khari

## 2023-10-22 ENCOUNTER — HEALTH MAINTENANCE LETTER (OUTPATIENT)
Age: 28
End: 2023-10-22

## 2023-10-31 ENCOUNTER — OFFICE VISIT (OUTPATIENT)
Dept: FAMILY MEDICINE | Facility: CLINIC | Age: 28
End: 2023-10-31
Payer: MEDICAID

## 2023-10-31 VITALS
WEIGHT: 128 LBS | OXYGEN SATURATION: 100 % | BODY MASS INDEX: 22.68 KG/M2 | HEART RATE: 70 BPM | SYSTOLIC BLOOD PRESSURE: 128 MMHG | DIASTOLIC BLOOD PRESSURE: 76 MMHG | HEIGHT: 63 IN | TEMPERATURE: 98 F | RESPIRATION RATE: 16 BRPM

## 2023-10-31 DIAGNOSIS — R12 HEARTBURN: ICD-10-CM

## 2023-10-31 DIAGNOSIS — Z00.00 ROUTINE GENERAL MEDICAL EXAMINATION AT A HEALTH CARE FACILITY: Primary | ICD-10-CM

## 2023-10-31 DIAGNOSIS — F41.1 GAD (GENERALIZED ANXIETY DISORDER): ICD-10-CM

## 2023-10-31 DIAGNOSIS — R73.9 ELEVATED BLOOD SUGAR: ICD-10-CM

## 2023-10-31 DIAGNOSIS — B00.9 HERPES SIMPLEX VIRUS INFECTION: ICD-10-CM

## 2023-10-31 DIAGNOSIS — F33.0 MILD EPISODE OF RECURRENT MAJOR DEPRESSIVE DISORDER (H): ICD-10-CM

## 2023-10-31 DIAGNOSIS — Z11.4 SCREENING FOR HIV (HUMAN IMMUNODEFICIENCY VIRUS): ICD-10-CM

## 2023-10-31 LAB
HBA1C MFR BLD: 5 % (ref 0–5.6)
HBV SURFACE AG SERPL QL IA: NONREACTIVE

## 2023-10-31 PROCEDURE — 86702 HIV-2 ANTIBODY: CPT | Mod: 59 | Performed by: FAMILY MEDICINE

## 2023-10-31 PROCEDURE — 99214 OFFICE O/P EST MOD 30 MIN: CPT | Mod: 25 | Performed by: FAMILY MEDICINE

## 2023-10-31 PROCEDURE — 90651 9VHPV VACCINE 2/3 DOSE IM: CPT | Performed by: FAMILY MEDICINE

## 2023-10-31 PROCEDURE — 87389 HIV-1 AG W/HIV-1&-2 AB AG IA: CPT | Performed by: FAMILY MEDICINE

## 2023-10-31 PROCEDURE — 36415 COLL VENOUS BLD VENIPUNCTURE: CPT | Performed by: FAMILY MEDICINE

## 2023-10-31 PROCEDURE — 90472 IMMUNIZATION ADMIN EACH ADD: CPT | Performed by: FAMILY MEDICINE

## 2023-10-31 PROCEDURE — 90471 IMMUNIZATION ADMIN: CPT | Performed by: FAMILY MEDICINE

## 2023-10-31 PROCEDURE — 99395 PREV VISIT EST AGE 18-39: CPT | Mod: 25 | Performed by: FAMILY MEDICINE

## 2023-10-31 PROCEDURE — 90715 TDAP VACCINE 7 YRS/> IM: CPT | Performed by: FAMILY MEDICINE

## 2023-10-31 PROCEDURE — 87340 HEPATITIS B SURFACE AG IA: CPT | Performed by: FAMILY MEDICINE

## 2023-10-31 PROCEDURE — 86701 HIV-1ANTIBODY: CPT | Mod: 59 | Performed by: FAMILY MEDICINE

## 2023-10-31 PROCEDURE — 83036 HEMOGLOBIN GLYCOSYLATED A1C: CPT | Performed by: FAMILY MEDICINE

## 2023-10-31 RX ORDER — ACYCLOVIR 400 MG/1
400 TABLET ORAL EVERY 8 HOURS
Qty: 15 TABLET | Refills: 1 | Status: SHIPPED | OUTPATIENT
Start: 2023-10-31 | End: 2023-11-29

## 2023-10-31 RX ORDER — ESCITALOPRAM OXALATE 10 MG/1
10 TABLET ORAL DAILY
Qty: 90 TABLET | Refills: 0 | Status: SHIPPED | OUTPATIENT
Start: 2023-10-31 | End: 2023-11-14

## 2023-10-31 ASSESSMENT — ENCOUNTER SYMPTOMS
HEADACHES: 1
NAUSEA: 0
SORE THROAT: 0
PARESTHESIAS: 0
FREQUENCY: 1
COUGH: 0
FEVER: 0
MYALGIAS: 0
JOINT SWELLING: 0
EYE PAIN: 0
DYSURIA: 0
HEMATURIA: 0
HEARTBURN: 1
CHILLS: 0
ARTHRALGIAS: 0
WEAKNESS: 0
HEMATOCHEZIA: 0
CONSTIPATION: 1
PALPITATIONS: 0
NERVOUS/ANXIOUS: 1
SHORTNESS OF BREATH: 0
ABDOMINAL PAIN: 0

## 2023-10-31 ASSESSMENT — ANXIETY QUESTIONNAIRES
GAD7 TOTAL SCORE: 6
3. WORRYING TOO MUCH ABOUT DIFFERENT THINGS: SEVERAL DAYS
7. FEELING AFRAID AS IF SOMETHING AWFUL MIGHT HAPPEN: SEVERAL DAYS
5. BEING SO RESTLESS THAT IT IS HARD TO SIT STILL: SEVERAL DAYS
1. FEELING NERVOUS, ANXIOUS, OR ON EDGE: SEVERAL DAYS
6. BECOMING EASILY ANNOYED OR IRRITABLE: NOT AT ALL
IF YOU CHECKED OFF ANY PROBLEMS ON THIS QUESTIONNAIRE, HOW DIFFICULT HAVE THESE PROBLEMS MADE IT FOR YOU TO DO YOUR WORK, TAKE CARE OF THINGS AT HOME, OR GET ALONG WITH OTHER PEOPLE: SOMEWHAT DIFFICULT
2. NOT BEING ABLE TO STOP OR CONTROL WORRYING: SEVERAL DAYS
GAD7 TOTAL SCORE: 6

## 2023-10-31 ASSESSMENT — PATIENT HEALTH QUESTIONNAIRE - PHQ9
SUM OF ALL RESPONSES TO PHQ QUESTIONS 1-9: 9
5. POOR APPETITE OR OVEREATING: SEVERAL DAYS

## 2023-10-31 ASSESSMENT — PAIN SCALES - GENERAL: PAINLEVEL: NO PAIN (0)

## 2023-10-31 NOTE — COMMUNITY RESOURCES LIST (ENGLISH)
10/31/2023   [facility] - Outpatient Clinics  N/A  For additional resource needs, please contact your health insurance member services or your primary care team.  Phone: [phone]   Email: N/A   Address: [facility] [place] [address]   Hours: N/A        Food and Nutrition       Food pantry  1  [employer] Distance: 1.61 miles      In-Person   [address] [place] [address]  Language: English, Serbian  Hours: Mon 10:00 AM - 6:00 PM , Tue 9:00 PM - 5:00 PM , Thu 11:00 PM - 7:00 PM , Sat 9:00 AM - 2:00 PM  Fees: Free   Phone: (179) 329-9321 Email: info@[employer]en.org Website: http://www.EquityZen.org/     2  [facility] (STEP) Distance: 1.79 miles      [facility]   [address] [place] [address]  Language: English, Luxembourger, Serbian  Hours: Mon 8:00 AM - 3:30 PM , Tue 12:00 PM - 7:00 PM , Wed - Thu 8:00 AM - 3:30 PM , Fri 8:00 AM - 11:30 PM  Fees: Free   Phone: (727) 560-2755 Email: info@STEPslp.org Website: http://stepslp.org/     SNAP application assistance  3  [employer] [place] Distance: 11 miles      Phone/Virtual   [address] [facility] [address] [place] [address]  Language: English, Hmong, Luxembourger, Malagasy, Serbian  Hours: Mon - Fri 8:30 AM - 4:30 PM  Fees: Free   Phone: (624) 141-7517 Email: helpline@hungersolutions.org Website: https://www.hungersolutions.org/programs/[place]-food-helpline/     4  [facility] ([facility]) Distance: 2.94 miles      In-Person, Phone/Virtual   [address] [place] [address]  Language: English, Indonesian, Zhao  Hours: Mon - Fri 9:00 AM - 5:00 PM  Fees: Free   Phone: (806) 107-7394 Email: fabiola@[employer].Tripvi Website: https://www.Revolution Foods.com/Vaccsys.org/     Soup kitchen or free meals  5  [facility] & Recreation [facility] - [facility] - [facility] Kitchen Distance: 1.59 miles      In-Person   [address] [place] [address]   Language: English  Hours: Mon - Fri 3:00 PM - 9:00 PM , Sat 12:00 PM - 6:00 PM  Fees: Free   Phone: (218) 862-4876 Email: jeferson@[employer]inton[employer]a Website: https://www.LintonFooda/parks__destinations/recreation_centers/[place]_recreation_center/     6  [facility] [place] - [facility] - [employer] Distance: 2.44 miles      In-Person   [address] [place] [address]  Language: English, Pitcairn Islander, Vatican citizen  Hours: Mon - Fri 12:00 PM - 1:00 PM  Fees: Free   Phone: (811) 906-3350 Email: info@[employer] Website: http://www.KemPharm/locations/mabel_kaitlynn          Important Numbers & Websites       [address].org  Poison Control   (729) 480-4180 [place]  Suicide and Crisis Lifeline   [address] [place], [place] [address]line.org  Childhelp [employer] Child Abuse Hotline   886.413.1293 Childhelphotline.org  National Sexual Assault Hotline   (775) 297-3068 (HOPE) [facility].org  National Runaway Safeline   (549) 331-2393 (RUNAWAY) [facility] Cud[facility]unaway.org  Pregnancy & Postpartum Support [place]   Call/text 104-560-6461 Ppsupportmn.org  Substance Abuse National Helpline ([facility]   139-286-HELP (6697) Findtreatment.gov  Emergency Services   918

## 2023-10-31 NOTE — COMMUNITY RESOURCES LIST (ENGLISH)
10/31/2023   [facility] - Outpatient Clinics  N/A  For additional resource needs, please contact your health insurance member services or your primary care team.  Phone: [phone]   Email: N/A   Address: [facility] [place] [address]   Hours: N/A        Food and Nutrition       Food pantry  1  [employer] Distance: 1.61 miles      In-Person   [address] [place] [address]  Language: English, Vatican citizen  Hours: Mon 10:00 AM - 6:00 PM , Tue 9:00 PM - 5:00 PM , Thu 11:00 PM - 7:00 PM , Sat 9:00 AM - 2:00 PM  Fees: Free   Phone: (598) 383-9345 Email: info@[employer].org Website: http://www.[employer].org/     2  [facility] (STEP) Distance: 1.79 miles      [facility]   [address] [place] [address]  Language: English, [place] citizen, Vatican citizen  Hours: Mon 8:00 AM - 3:30 PM , Tue 12:00 PM - 7:00 PM , Wed - Thu 8:00 AM - 3:30 PM , Fri 8:00 AM - 11:30 PM  Fees: Free   Phone: (187) 569-1410 Email: info@STEPslp.org Website: http://stepslp.org/     SNAP application assistance  3  [employer] [place] Distance: 11 miles      Phone/Virtual   [address] [facility] [address] [place] [address]  Language: English, Hmong, Liechten[place] citizen, Prydeinig, [place] citizen  Hours: Mon - Fri 8:30 AM - 4:30 PM  Fees: Free   Phone: (141) 542-6402 Email: helpline@hungersolutions.org Website: https://www.hungersolutions.org/programs/[place]-food-helpline/     4  [facility] ([facility]) Distance: 2.94 miles      In-Person, Phone/Virtual   [address] [place] [address]  Language: English, Montenegrin, Zhao  Hours: Mon - Fri 9:00 AM - 5:00 PM  Fees: Free   Phone: (721) 901-5151 Email: fabiola@[employer]lying [employer].Epiphany Website: https://www.CadenceMD.com/SumAll.org/     Soup kitchen or free meals  5  [facility] & Recreation [facility] - [facility] - [facility] Kitchen Distance: 1.59 miles      In-Person   [address] [place] [address]   Language: English  Hours: Mon - Fri 3:00 PM - 9:00 PM , Sat 12:00 PM - 6:00 PM  Fees: Free   Phone: (310) 178-1893 Email: jeferson@Ch[employer]ter[employer]todoria Website: https://www.ChidesterAvtodoria/parks__destinations/recreation_centers/[place]_recreation_center/     6  [facility] [place] - [facility]abel [facility] - [employer]ishes Distance: 2.44 miles      In-Person   [address] [place] [address]  Language: English, Liberian, Sammarinese  Hours: Mon - Fri 12:00 PM - 1:00 PM  Fees: Free   Phone: (699) 833-4486 Email: info@[employer]U Website: http://www.InstaEDU/locations/mabel_kaitlynn          Important Numbers & Websites       [address].org  Poison Control   (159) 880-9895 [place]poison.[place]  Suicide and Crisis Lifeline   [address] [place], [place] [address]line.org  Childhelp [facility] [place] Child Abuse Hotline   510.342.2471 Childhelphotline.org  National Sexual Assault Hotline   (653) 330-4716 (HOPE) [facility].org  National Runaway Safeline   (370) 948-2997 (RUNAWAY) [facility] Hospitalrunaway.org  Pregnancy & Postpartum Support [place]   Call/text 374-619-6186 Ppsupportmn.org  Substance Abuse National Helpline ([facility]   320-069-HELP (4901) Findtreatment.gov  Emergency Services   916

## 2023-10-31 NOTE — PATIENT INSTRUCTIONS
Patient Education     Preventive Health Recommendations  Male Ages 26 - 39    Yearly exam:             See your health care provider every year in order to  o   Review health changes.   o   Discuss preventive care.    o   Review your medicines if your doctor has prescribed any.  You should be tested each year for STDs (sexually transmitted diseases), if you re at risk.   After age 35, talk to your provider about cholesterol testing. If you are at risk for heart disease, have your cholesterol tested at least every 5 years.   If you are at risk for diabetes, you should have a diabetes test (fasting glucose).  Shots: Get a flu shot each year. Get a tetanus shot every 10 years.     Nutrition:  Eat at least 5 servings of fruits and vegetables daily.   Eat whole-grain bread, whole-wheat pasta and brown rice instead of white grains and rice.   Get adequate Calcium and Vitamin D.     Lifestyle  Exercise for at least 150 minutes a week (30 minutes a day, 5 days a week). This will help you control your weight and prevent disease.   Limit alcohol to one drink per day.   No smoking.   Wear sunscreen to prevent skin cancer.   See your dentist every six months for an exam and cleaning.

## 2023-10-31 NOTE — COMMUNITY RESOURCES LIST (ENGLISH)
10/31/2023   [facility] - Outpatient Clinics  N/A  For additional resource needs, please contact your health insurance member services or your primary care team.  Phone: [phone]   Email: N/A   Address: [facility] [place] [address]   Hours: N/A        Food and Nutrition       Food pantry  1  [employer] Shel[employer] Distance: 1.61 miles      In-Person   [address] [place] [address]  Language: English, Bahraini  Hours: Mon 10:00 AM - 6:00 PM , Tue 9:00 PM - 5:00 PM , Thu 11:00 PM - 7:00 PM , Sat 9:00 AM - 2:00 PM  Fees: Free   Phone: (444) 958-2773 Email: info@[employer].org Website: http://www.[employer] Technologies.org/     2  [facility] (STEP) Distance: 1.79 miles      [facility]   [address] [place] [address]  Language: English, Djiboutian, Bahraini  Hours: Mon 8:00 AM - 3:30 PM , Tue 12:00 PM - 7:00 PM , Wed - Thu 8:00 AM - 3:30 PM , Fri 8:00 AM - 11:30 PM  Fees: Free   Phone: (847) 930-5003 Email: info@STEPslp.org Website: http://stepslp.org/     SNAP application assistance  3  [employer] [place] Distance: 11 miles      Phone/Virtual   [address] [facility] [address] [place] [address]  Language: English, Hmong, Djiboutian, East Timorese, Bahraini  Hours: Mon - Fri 8:30 AM - 4:30 PM  Fees: Free   Phone: (871) 157-6091 Email: helpline@hungersolutions.org Website: https://www.hungersolutions.org/programs/[place]-food-helpline/     4  [facility] [place] [facility] ([facility]) Distance: 2.94 miles      In-Person, Phone/Virtual   [address] [place] [address]  Language: English, Thai, Zhao  Hours: Mon - Fri 9:00 AM - 5:00 PM  Fees: Free   Phone: (914) 631-4606 Email: fabiola@Shopeando.InPhase Technologies Website: https://www.MakeSpace.com/testbirds.org/     Soup kitchen or free meals  5  [facility] & Recreation [facility] Kitchen Distance: 1.59 miles      In-Person   [address] [place] [address]   Language: English  Hours: Mon - Fri 3:00 PM - 9:00 PM , Sat 12:00 PM - 6:00 PM  Fees: Free   Phone: (656) 625-3802 Email: jeferson@[employer] Website: https://www.[employer]/parks__destinations/recreation_centers/[place]_recreation_center/     6  [facility] [place] - [facility]abel [facility]RE[facility] - [employer] [employer]ishes Distance: 2.44 miles      In-Person   [address] [place] [address]  Language: English, Papua [place] Guinean, Andorran  Hours: Mon - Fri 12:00 PM - 1:00 PM  Fees: Free   Phone: (376) 258-2033 Email: info@Net[employer] Website: http://www.Netlist/locations/mabel_kaitlynn          Important Numbers & Websites       [address].org  Poison Control   (148) 720-7622 [place]poison.[place]  Suicide and Crisis Lifeline   [address] [place], [place] [address]line.org  Childhelp [place] Child Abuse Hotline   393.921.9521 Childhelphotline.org  National Sexual Assault Hotline   (647) 957-3968 (HOPE) [facility].org  National Runaway Safeline   (591) 199-9112 (RUNAWAY) [facility].org  Pregnancy & Postpartum Support [place]   Call/text 831-408-7557 Ppsupportmn.org  Substance Abuse National Helpline ([facility]   159-767-HELP (5915) Findtreatment.gov  Emergency Services   919

## 2023-10-31 NOTE — PROGRESS NOTES
"SUBJECTIVE:   CC: Angel is an 28 year old who presents for preventative health visit.       10/31/2023     6:45 AM   Additional Questions   Roomed by Kerrie       Healthy Habits:     Getting at least 3 servings of Calcium per day:  Yes    Bi-annual eye exam:  Yes    Dental care twice a year:  NO    Sleep apnea or symptoms of sleep apnea:  Daytime drowsiness and Sleep apnea    Diet:  Regular (no restrictions)    Frequency of exercise:  1 day/week    Duration of exercise:  Less than 15 minutes    Taking medications regularly:  Yes    Medication side effects:  Lightheadedness and Other    Additional concerns today:  Yes    Patient is fasting  He has FMLA forms to complete but did not bring to appointment, He would like to discuss and drop off forms for completion.      Have you ever done Advance Care Planning? (For example, a Health Directive, POLST, or a discussion with a medical provider or your loved ones about your wishes): No, advance care planning information given to patient to review.  Patient plans to discuss their wishes with loved ones or provider.      Social History     Tobacco Use    Smoking status: Former     Years: 1     Types: Cigarettes     Quit date: 2023     Years since quittin.3    Smokeless tobacco: Never    Tobacco comments:     Did vaping for 2 years    Substance Use Topics    Alcohol use: Not on file             10/31/2023     7:27 AM   Alcohol Use   Prescreen: >3 drinks/day or >7 drinks/week? Not Applicable       Last PSA: No results found for: \"PSA\"    Reviewed orders with patient. Reviewed health maintenance and updated orders accordingly - Yes  Lab work is in process  Labs reviewed in EPIC    Reviewed and updated as needed this visit by clinical staff    Allergies               Reviewed and updated as needed this visit by Provider                 No past medical history on file.   No past surgical history on file.    Review of Systems   Constitutional:  Negative for chills and " fever.   HENT:  Positive for ear pain. Negative for congestion, hearing loss and sore throat.    Eyes:  Negative for pain and visual disturbance.   Respiratory:  Negative for cough and shortness of breath.    Cardiovascular:  Negative for chest pain, palpitations and peripheral edema.   Gastrointestinal:  Positive for constipation and heartburn. Negative for abdominal pain, hematochezia and nausea.   Genitourinary:  Positive for frequency. Negative for dysuria, genital sores, hematuria, impotence, penile discharge and urgency.   Musculoskeletal:  Negative for arthralgias, joint swelling and myalgias.   Skin:  Negative for rash.   Neurological:  Positive for headaches. Negative for weakness and paresthesias.   Psychiatric/Behavioral:  Positive for mood changes. The patient is nervous/anxious.      CONSTITUTIONAL: NEGATIVE for fever, chills, change in weight  INTEGUMENTARY/SKIN: NEGATIVE for worrisome rashes, moles or lesions  EYES: NEGATIVE for vision changes or irritation  ENT: NEGATIVE for ear, mouth and throat problems  RESP: NEGATIVE for significant cough or SOB  CV: NEGATIVE for chest pain, palpitations or peripheral edema  GI: NEGATIVE for nausea, abdominal pain, heartburn, or change in bowel habits   male: negative for dysuria, hematuria, decreased urinary stream, erectile dysfunction, urethral discharge  MUSCULOSKELETAL: NEGATIVE for significant arthralgias or myalgia  NEURO: NEGATIVE for weakness, dizziness or paresthesias  PSYCHIATRIC: NEGATIVE for changes in mood or affect    OBJECTIVE:   There were no vitals taken for this visit.    Physical Exam  GENERAL: healthy, alert and no distress  EYES: Eyes grossly normal to inspection, PERRL and conjunctivae and sclerae normal  HENT: ear canals and TM's normal, nose and mouth without ulcers or lesions  NECK: no adenopathy, no asymmetry, masses, or scars and thyroid normal to palpation  RESP: lungs clear to auscultation - no rales, rhonchi or wheezes  CV:  regular rate and rhythm, normal S1 S2, no S3 or S4, no murmur, click or rub, no peripheral edema and peripheral pulses strong  ABDOMEN: soft, nontender, no hepatosplenomegaly, no masses and bowel sounds normal  MS: no gross musculoskeletal defects noted, no edema  SKIN: few vesicular lesions on erythematous base new sacrum only one side, no suspicious lesions or rashes  NEURO: Normal strength and tone, mentation intact and speech normal  PSYCH: mentation appears normal, affect normal/bright    Diagnostic Test Results:  Labs reviewed in Epic    ASSESSMENT/PLAN:   1. Routine general medical examination at a health care facility      2. HUGO (generalized anxiety disorder)  Better but not much improved on 5mg, advised increasing to 10mg, he is tolerating it well   - escitalopram (LEXAPRO) 10 MG tablet; Take 1 tablet (10 mg) by mouth daily  Dispense: 90 tablet; Refill: 0    3. Mild episode of recurrent major depressive disorder (H24)  As above  - escitalopram (LEXAPRO) 10 MG tablet; Take 1 tablet (10 mg) by mouth daily  Dispense: 90 tablet; Refill: 0    4. Herpes simplex virus infection  Treat, history of of this in the past, put refill on script  - acyclovir (ZOVIRAX) 400 MG tablet; Take 1 tablet (400 mg) by mouth every 8 hours for 5 days  Dispense: 15 tablet; Refill: 1    5. Elevated blood sugar  Normal   - Hemoglobin A1c    6. Heartburn  Resolved with PPI    7. Screening for HIV (human immunodeficiency virus)    - HIV Antigen Antibody Combo  - Hepatitis B surface antigen      Patient has been advised of split billing requirements and indicates understanding: Yes      COUNSELING:   Reviewed preventive health counseling, as reflected in patient instructions       Healthy diet/nutrition       Vision screening       Hearing screening        He reports that he quit smoking about 4 months ago. His smoking use included cigarettes. He has never used smokeless tobacco.            Davon Wilson DO  [facility]  [facility]

## 2023-11-01 ENCOUNTER — TELEPHONE (OUTPATIENT)
Dept: FAMILY MEDICINE | Facility: CLINIC | Age: 28
End: 2023-11-01

## 2023-11-01 ENCOUNTER — TELEPHONE (OUTPATIENT)
Dept: INFECTIOUS DISEASES | Facility: CLINIC | Age: 28
End: 2023-11-01

## 2023-11-01 DIAGNOSIS — Z21 ASYMPTOMATIC HUMAN IMMUNODEFICIENCY VIRUS (HIV) INFECTION STATUS (H): Primary | ICD-10-CM

## 2023-11-01 DIAGNOSIS — Z21 HIV TEST POSITIVE (H): Primary | ICD-10-CM

## 2023-11-01 LAB
HIV 1+2 AB+HIV1 P24 AG SERPL QL IA: REACTIVE
HIV 1+2 AB+HIV1P24 AG SERPLBLD IA.RAPID: ABNORMAL
HIV 2 AB SERPLBLD QL IA.RAPID: NONREACTIVE
HIV1 AB SERPLBLD QL IA.RAPID: REACTIVE

## 2023-11-01 NOTE — TELEPHONE ENCOUNTER
"Called patient to discuss referral. Patient states he is doing \"good\" and has no questions at this time and is interested in scheduling an appointment with us. Patient scheduled for 11/9 and provided with appointment details. Advised patient we ordered labs that he can complete at his lab appointment tomorrow ahead of his appointment with us. Patient voiced understanding and stated he would follow up in the interim with any questions.  "

## 2023-11-01 NOTE — TELEPHONE ENCOUNTER
Called patient and scheduled lab only visit.  He has been in contact with Infectious Disease.      Elina RN    Triage Nurse  [facility]

## 2023-11-01 NOTE — TELEPHONE ENCOUNTER
General Call    Contacts         Type Contact Phone/Fax    11/01/2023 02:58 PM CDT Phone (Incoming) Angel Simeon (Self) [phone] (H)          Reason for Call:  Patient would like clarification on test results.    What are your questions or concerns:  Patient would like to go over test results with PCP or member of care team ASAP.    Date of last appointment with provider: 10/31/23    Could we send this information to you in [facility] or would you prefer to receive a phone call?:   Patient would prefer a phone call   Okay to leave a detailed message?: Yes at Home number on file [phone] (home)

## 2023-11-01 NOTE — TELEPHONE ENCOUNTER
I did add RNA level order  please have him come back to do additional labs , referral to specialist made to do further testing and work up   Davon Wilson D.O.

## 2023-11-02 ENCOUNTER — LAB (OUTPATIENT)
Dept: LAB | Facility: CLINIC | Age: 28
End: 2023-11-02
Payer: COMMERCIAL

## 2023-11-02 DIAGNOSIS — Z21 ASYMPTOMATIC HUMAN IMMUNODEFICIENCY VIRUS (HIV) INFECTION STATUS (H): ICD-10-CM

## 2023-11-02 DIAGNOSIS — Z21 POSITIVE LABORATORY TESTING FOR HUMAN IMMUNODEFICIENCY VIRUS (H): ICD-10-CM

## 2023-11-02 LAB
ALBUMIN SERPL BCG-MCNC: 4.4 G/DL (ref 3.5–5.2)
ALBUMIN UR-MCNC: NEGATIVE MG/DL
ALP SERPL-CCNC: 59 U/L (ref 40–129)
ALT SERPL W P-5'-P-CCNC: 33 U/L (ref 0–70)
AMYLASE SERPL-CCNC: 71 U/L (ref 28–100)
ANION GAP SERPL CALCULATED.3IONS-SCNC: 9 MMOL/L (ref 7–15)
APPEARANCE UR: CLEAR
AST SERPL W P-5'-P-CCNC: 26 U/L (ref 0–45)
BACTERIA #/AREA URNS HPF: ABNORMAL /HPF
BASOPHILS # BLD AUTO: 0 10E3/UL (ref 0–0.2)
BASOPHILS NFR BLD AUTO: 0 %
BILIRUB SERPL-MCNC: 0.4 MG/DL
BILIRUB UR QL STRIP: NEGATIVE
BUN SERPL-MCNC: 12.7 MG/DL (ref 6–20)
CALCIUM SERPL-MCNC: 9.5 MG/DL (ref 8.6–10)
CHLORIDE SERPL-SCNC: 103 MMOL/L (ref 98–107)
COLOR UR AUTO: YELLOW
CREAT SERPL-MCNC: 0.75 MG/DL (ref 0.67–1.17)
DEPRECATED HCO3 PLAS-SCNC: 27 MMOL/L (ref 22–29)
EGFRCR SERPLBLD CKD-EPI 2021: >90 ML/MIN/1.73M2
EOSINOPHIL # BLD AUTO: 0.2 10E3/UL (ref 0–0.7)
EOSINOPHIL NFR BLD AUTO: 3 %
ERYTHROCYTE [DISTWIDTH] IN BLOOD BY AUTOMATED COUNT: 13.4 % (ref 10–15)
GLUCOSE SERPL-MCNC: 95 MG/DL (ref 70–99)
GLUCOSE UR STRIP-MCNC: NEGATIVE MG/DL
HCT VFR BLD AUTO: 45.1 % (ref 40–53)
HGB BLD-MCNC: 15.2 G/DL (ref 13.3–17.7)
HGB UR QL STRIP: NEGATIVE
IMM GRANULOCYTES # BLD: 0 10E3/UL
IMM GRANULOCYTES NFR BLD: 0 %
KETONES UR STRIP-MCNC: NEGATIVE MG/DL
LEUKOCYTE ESTERASE UR QL STRIP: NEGATIVE
LIPASE SERPL-CCNC: 23 U/L (ref 13–60)
LYMPHOCYTES # BLD AUTO: 3.3 10E3/UL (ref 0.8–5.3)
LYMPHOCYTES NFR BLD AUTO: 54 %
MCH RBC QN AUTO: 29.7 PG (ref 26.5–33)
MCHC RBC AUTO-ENTMCNC: 33.7 G/DL (ref 31.5–36.5)
MCV RBC AUTO: 88 FL (ref 78–100)
MONOCYTES # BLD AUTO: 0.4 10E3/UL (ref 0–1.3)
MONOCYTES NFR BLD AUTO: 6 %
NEUTROPHILS # BLD AUTO: 2.3 10E3/UL (ref 1.6–8.3)
NEUTROPHILS NFR BLD AUTO: 37 %
NITRATE UR QL: NEGATIVE
NRBC # BLD AUTO: 0 10E3/UL
NRBC BLD AUTO-RTO: 0 /100
PH UR STRIP: 6.5 [PH] (ref 5–7)
PLATELET # BLD AUTO: 174 10E3/UL (ref 150–450)
POTASSIUM SERPL-SCNC: 4.4 MMOL/L (ref 3.4–5.3)
PROT SERPL-MCNC: 8.6 G/DL (ref 6.4–8.3)
RBC # BLD AUTO: 5.11 10E6/UL (ref 4.4–5.9)
RBC #/AREA URNS AUTO: ABNORMAL /HPF
SODIUM SERPL-SCNC: 139 MMOL/L (ref 135–145)
SP GR UR STRIP: >=1.03 (ref 1–1.03)
SQUAMOUS #/AREA URNS AUTO: ABNORMAL /LPF
UROBILINOGEN UR STRIP-ACNC: 0.2 E.U./DL
WBC # BLD AUTO: 6.3 10E3/UL (ref 4–11)
WBC #/AREA URNS AUTO: ABNORMAL /HPF

## 2023-11-02 PROCEDURE — 83690 ASSAY OF LIPASE: CPT

## 2023-11-02 PROCEDURE — 86803 HEPATITIS C AB TEST: CPT

## 2023-11-02 PROCEDURE — 86704 HEP B CORE ANTIBODY TOTAL: CPT

## 2023-11-02 PROCEDURE — 36415 COLL VENOUS BLD VENIPUNCTURE: CPT

## 2023-11-02 PROCEDURE — 87536 HIV-1 QUANT&REVRSE TRNSCRPJ: CPT

## 2023-11-02 PROCEDURE — 86359 T CELLS TOTAL COUNT: CPT

## 2023-11-02 PROCEDURE — 86360 T CELL ABSOLUTE COUNT/RATIO: CPT

## 2023-11-02 PROCEDURE — 82150 ASSAY OF AMYLASE: CPT

## 2023-11-02 PROCEDURE — 86708 HEPATITIS A ANTIBODY: CPT

## 2023-11-02 PROCEDURE — 85025 COMPLETE CBC W/AUTO DIFF WBC: CPT

## 2023-11-02 PROCEDURE — 86644 CMV ANTIBODY: CPT

## 2023-11-02 PROCEDURE — 86777 TOXOPLASMA ANTIBODY: CPT

## 2023-11-02 PROCEDURE — 80053 COMPREHEN METABOLIC PANEL: CPT

## 2023-11-02 PROCEDURE — 81001 URINALYSIS AUTO W/SCOPE: CPT

## 2023-11-02 PROCEDURE — 87340 HEPATITIS B SURFACE AG IA: CPT

## 2023-11-02 PROCEDURE — 86706 HEP B SURFACE ANTIBODY: CPT

## 2023-11-02 PROCEDURE — 81381 HLA I TYPING 1 ALLELE HR: CPT

## 2023-11-02 PROCEDURE — 86780 TREPONEMA PALLIDUM: CPT

## 2023-11-03 LAB
CD3 CELLS # BLD: 2629 CELLS/UL (ref 603–2990)
CD3 CELLS NFR BLD: 83 % (ref 49–84)
CD3+CD4+ CELLS # BLD: 382 CELLS/UL (ref 441–2156)
CD3+CD4+ CELLS NFR BLD: 12 % (ref 28–63)
CD3+CD4+ CELLS/CD3+CD8+ CLL BLD: 0.18 % (ref 1.4–2.6)
CD3+CD8+ CELLS # BLD: 2155 CELLS/UL (ref 125–1312)
CD3+CD8+ CELLS NFR BLD: 68 % (ref 10–40)
HAV IGG SER QL IA: REACTIVE
HBV CORE AB SERPL QL IA: NONREACTIVE
HBV SURFACE AB SERPL IA-ACNC: 0.76 M[IU]/ML
HBV SURFACE AB SERPL IA-ACNC: NONREACTIVE M[IU]/ML
HBV SURFACE AG SERPL QL IA: NONREACTIVE
HCV AB SERPL QL IA: NONREACTIVE
T CELL COMMENT: ABNORMAL
T PALLIDUM AB SER QL: NONREACTIVE

## 2023-11-04 LAB
HIV1 RNA # PLAS NAA DL=20: ABNORMAL COPIES/ML
HIV1 RNA SERPL NAA+PROBE-LOG#: 5.3 {LOG_COPIES}/ML

## 2023-11-06 LAB
CMV IGG SERPL IA-ACNC: 6.5 U/ML
CMV IGG SERPL IA-ACNC: ABNORMAL
T GONDII IGG SER QL: <3 IU/ML (ref 0–7.1)

## 2023-11-07 LAB
HLA TYPE SA METHOD: NORMAL
HLA TYPE SA RESULT: NORMAL

## 2023-11-09 ENCOUNTER — OFFICE VISIT (OUTPATIENT)
Dept: INFECTIOUS DISEASES | Facility: CLINIC | Age: 28
End: 2023-11-09
Attending: INTERNAL MEDICINE
Payer: COMMERCIAL

## 2023-11-09 VITALS
DIASTOLIC BLOOD PRESSURE: 78 MMHG | TEMPERATURE: 98.3 F | SYSTOLIC BLOOD PRESSURE: 112 MMHG | OXYGEN SATURATION: 98 % | HEART RATE: 93 BPM

## 2023-11-09 DIAGNOSIS — Z21 HIV TEST POSITIVE (H): ICD-10-CM

## 2023-11-09 PROCEDURE — 87491 CHLMYD TRACH DNA AMP PROBE: CPT | Performed by: INTERNAL MEDICINE

## 2023-11-09 PROCEDURE — 99204 OFFICE O/P NEW MOD 45 MIN: CPT | Performed by: INTERNAL MEDICINE

## 2023-11-09 PROCEDURE — G0463 HOSPITAL OUTPT CLINIC VISIT: HCPCS | Performed by: INTERNAL MEDICINE

## 2023-11-09 PROCEDURE — 87591 N.GONORRHOEAE DNA AMP PROB: CPT | Performed by: INTERNAL MEDICINE

## 2023-11-09 NOTE — LETTER
11/9/2023       RE: Angel Simeon  [address]  [address]  [facility] [address]     Dear Colleague,    Thank you for referring your patient, Angel Simeon, to the [facility] INFECTIOUS DISEASE CLINIC [place] at [facility]. Please see a copy of my visit note below.    Angel Simeon is here today for HIV care.    Assessment & Plan/Recommendations     ID problem list:  Recently diagnosed HIV    Recs:  1.  HIV infection  - discussed antiretroviral therapy initiation, called [facility] and Biktarvy would be covered by his insurance with $5 copay; discussed initiation of Biktarvy, how to take, possible side effects  - baseline labs were already sent prior to this visit, will repeat prior to his next visit in approx 1 month  - STI screening - already checked by primary care provider prior to visit; will add gonorrhea and chlamydia swabs today which were not done at his last primary care visit  - can follow up with [facility] ID pharmacist if able prior to next ID clinic to further discuss medications   - follow up in approx 1 month with repeat labs at that time    2. Health maintenance  - follows with primary care provider Dr. Davon Wilson ([facility])  - vaccines discussed: will get COVID booster and influenza booster today; will address further vaccines at next visit    Colonoscopy: n/a  GC/Chlamydia: urine neg 9/2023  Syphilis: neg 11/2023  Quantiferon:   G6PD:   HLA-: neg 11/2/23  Crypto (if CD4<50):   Toxo IgG: nonreactive 11/2023  CMV IgG: reactive 11/2023  Hep A antibody : reactive 11/2023  Hep B antibody: HBsAg non-reactive, HBsAb reactive, HBcAB non-reactive 11/2023  Hep C antibody: non-reactivfe 11/2023  Hep A vaccine: received 0484-0305  Hep B vaccine: received 8863-6766  Tdap vaccine: 10/31/23  Flu vaccine: last 2019, 11/14/23  Prevnar-13 PCV conj vaccine:   Pneumovax-23 PPSV poly vaccine:   Meningococcal MenACWY (Menveo, Menactra) vaccine:  2011  HPV vaccine: 2011, 2023, ?  MMR vaccine: 2003, 2011  Varicella vaccine: 2003, 2011  COVID-19 vaccine: 2019, 11/14/23    I communicated with the patient at this visit.      Patient was seen on 11/09/23 in ID clinic.    55 minutes spent by me on the date of the encounter doing chart review, history and exam, documentation and further activities per the note    Juan Person MD  Infectious Diseases          11/15/23 addendum/update: Chlamydia throat swab screen was positive from this past visit -  attempted to call patient ([phone]) twice to discuss this, no answer so left generic message.    Juan Person MD        Subjective       HPI:  Angel Simeon is a 28 year old male with PMH including anxiety/depression, GERD, HSV infection, prior smoking, and recent diagnosis of HIV infection (10/2023 at primary care screening), who presents to ID clinic for HIV care.    Per patient report, doing ok since his diagnosis last month. Has not started any antiretroviral therapy yet.  Was not feeling well having abdominal discomfort (comes/goes) at time of HIV screening, did also go to hospital 10/12 (was given omeprazole which didn't change his symptoms much) who referred him to PCP for further work-up. Occasional nausea, no vomiting, occasional loose stool (non-watery, non-bloody). No fevers/chills, (in 8/2023 had 2 wks of cough, phlegm, fevers that self-limiting and resolved), +night sweats (since 8/2023) non-drenching and intermittent (~1x/week), no weight change, no current rashes, mild cervical gland swelling, no sore throat, no issues swallowing, intermittent headaches, no groin/anus discharge or lesions. In 6/2023 went to ED in [facility] after fainting, no official diagnosis at that time, just was given potassium and fluids, unsure if they screened for HIV at that time.     Thinks he has a good support system. Family lives in [place], boyfriend lives here. Remaining social history as noted below.    HIV past  medical history:  Diagnosed: 10/2023  Approximate date of transmission:   Risk factors: sexual/MSM  Kashif CD4: 382 (11/2023)  Viral load at time of diagnosis: 188,000  Genotypes/mutation history: ordered but not resulted as of 11/2023  Treatment history: n/a  Prior OIs: none known  TB screening: never diagnosed/treated; previously screened 2019 negative for immigration status change    ART Adherence:  Current regimen: n/a  Missed doses in last week, month:  Estimates adherence at:  Medication side effects:  OTC medications:    Social history:  Background: Originally from [place], moved here 7/2023 (for work); moved to  when 7 yo (born in [place])  Lives in/with: lives with boyfriend (he's aware of the diagnosis, he since has tested negative)  Supports: n/a  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, then will transition to [employer]-provided insurance)  International travel: none recently - last in 2019 to [place] (3 months)  Pets: none  Alcohol: socially (once every 2 wks)  Tobacco: quit smoking tobacco (2/2023)  Other substances: occasional weed, never IVDU  Sexual Hx: in relationship, male  Sexual activity: yes with one partner (with multiple partners in [place] prior to this), unclear if current partner has any history of STIs (but has tested HIV-negative)  Adherence to condoms: intermittently  History of STI diagnosis and treatment: none prior    Prior PrEP: was on Descovy last in 2020, but was only on it for a year and then discontinued when lost his job/insurance      PAST MEDICAL HISTORY  Otherwise as per HPI    PAST SURGICAL HISTORY  Otherwise as per HPI    ALLERGIES AND DRUG REACTIONS  No Known Allergies    FAMILY HISTORY  No known immunocompromising conditions or infections unless listed below  family history is not on file.    SOCIAL AND FUNCTIONAL HISTORY  Social History     Tobacco Use    Smoking status: Former     Years: 1     Types: Cigarettes     Quit date: 7/1/2023      Years since quittin.3    Smokeless tobacco: Never    Tobacco comments:     Did vaping for 2 years    Vaping Use    Vaping Use: Former    Quit date: 2023    Substances: THC, Flavoring    Devices: Disposable     Otherwise as per HPI    CURRENT ANTIBIOTICS  Other medications reviewed in EPIC  n/a    REVIEW OF SYSTEMS  ROS obtained, pertinent positives and negatives as above.    Objective   PHYSICAL EXAMINATION  /78   Pulse 93   Temp 98.3  F (36.8  C) (Oral)   SpO2 98%     Constitutional:  appearance not in distress, appears comfortable   Eyes: no conjunctival injection, no scleral icterus  ENT: no nasal discharge, membranes moist, oropharynx pink and without exudates or thrush, +mild tonsillar enlargement  Cardiovascular: regular rate and rhythm  Pulmonary: unlabored breathing, clear to ascultation bilaterally  Gastrointestinal: abdomen non-distended, soft, non-tender   Lymphatic: +mild cervical lymphadenopathy (non-tender  Extremities: warm and well perfused   Skin: no rashes  Neurologic: awake, alert and interactive      Other Significant Information (Labs, cultures, radiology, etc)    Recent micro reviewed:   23 syphilis screen: negative  23 HCV screen: negative  23 gonorrhea/chlamydia urine: negative  23 HIV screen: positive  23 HBsAg non-reactive  23 HIV VL: 188k  23 CD4 count 382 (12%)  23 syphilis screen: negative

## 2023-11-09 NOTE — PROGRESS NOTES
Angel Simeon is here today for HIV care.    Assessment & Plan/Recommendations     ID problem list:  Recently diagnosed HIV    Recs:  1.  HIV infection  - discussed antiretroviral therapy initiation, called [facility] and Biktarvy would be covered by his insurance with $5 copay; discussed initiation of Biktarvy, how to take, possible side effects  - baseline labs were already sent prior to this visit, will repeat prior to his next visit in approx 1 month  - STI screening - already checked by primary care provider prior to visit; will add gonorrhea and chlamydia swabs today which were not done at his last primary care visit  - can follow up with [facility] ID pharmacist if able prior to next ID clinic to further discuss medications   - follow up in approx 1 month with repeat labs at that time    2. Health maintenance  - follows with primary care provider Dr. Davon Wilson ([facility])  - vaccines discussed: will get COVID booster and influenza booster today; will address further vaccines at next visit    Colonoscopy: n/a  GC/Chlamydia: urine neg 9/2023  Syphilis: neg 11/2023  Quantiferon:   G6PD:   HLA-: neg 11/2/23  Crypto (if CD4<50):   Toxo IgG: nonreactive 11/2023  CMV IgG: reactive 11/2023  Hep A antibody : reactive 11/2023  Hep B antibody: HBsAg non-reactive, HBsAb reactive, HBcAB non-reactive 11/2023  Hep C antibody: non-reactivfe 11/2023  Hep A vaccine: received 0633-3729  Hep B vaccine: received 1170-9085  Tdap vaccine: 10/31/23  Flu vaccine: last 2019, 11/14/23  Prevnar-13 PCV conj vaccine:   Pneumovax-23 PPSV poly vaccine:   Meningococcal MenACWY (Menveo, Menactra) vaccine: 2011  HPV vaccine: 2011, 2023, ?  MMR vaccine: 2003, 2011  Varicella vaccine: 2003, 2011  COVID-19 vaccine: 2019, 11/14/23    I communicated with the patient at this visit.      Patient was seen on 11/09/23 in ID clinic.    55 minutes spent by me on the date of the encounter doing chart review, history and exam, documentation  and further activities per the note    Juan Person MD  Infectious Diseases          11/15/23 addendum/update: Chlamydia throat swab screen was positive from this past visit -  attempted to call patient ([phone]) twice to discuss this, no answer so left generic message.    Juan Person MD        Subjective       HPI:  Angel Simeon is a 28 year old male with PMH including anxiety/depression, GERD, HSV infection, prior smoking, and recent diagnosis of HIV infection (10/2023 at primary care screening), who presents to ID clinic for HIV care.    Per patient report, doing ok since his diagnosis last month. Has not started any antiretroviral therapy yet.  Was not feeling well having abdominal discomfort (comes/goes) at time of HIV screening, did also go to hospital 10/12 (was given omeprazole which didn't change his symptoms much) who referred him to PCP for further work-up. Occasional nausea, no vomiting, occasional loose stool (non-watery, non-bloody). No fevers/chills, (in 8/2023 had 2 wks of cough, phlegm, fevers that self-limiting and resolved), +night sweats (since 8/2023) non-drenching and intermittent (~1x/week), no weight change, no current rashes, mild cervical gland swelling, no sore throat, no issues swallowing, intermittent headaches, no groin/anus discharge or lesions. In 6/2023 went to ED in [facility] after fainting, no official diagnosis at that time, just was given potassium and fluids, unsure if they screened for HIV at that time.     Thinks he has a good support system. Family lives in [place], boyfriend lives here. Remaining social history as noted below.    HIV past medical history:  Diagnosed: 10/2023  Approximate date of transmission:   Risk factors: sexual/MSM  Kashif CD4: 382 (11/2023)  Viral load at time of diagnosis: 188,000  Genotypes/mutation history: ordered but not resulted as of 11/2023  Treatment history: n/a  Prior OIs: none known  TB screening: never diagnosed/treated; previously  screened 2019 negative for immigration status change    ART Adherence:  Current regimen: n/a  Missed doses in last week, month:  Estimates adherence at:  Medication side effects:  OTC medications:    Social history:  Background: Originally from [place], moved here 2023 (for work); [place] 7 [place] (born in [place])  Lives in/with: lives with boyfriend (he's aware of the diagnosis, he since has tested negative)  Supports: n/a  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, then will transition to [employer]-provided insurance)  International travel: none recently - last in 2019 to [place] (3 months)  Pets: none  Alcohol: socially (once every 2 wks)  Tobacco: quit smoking tobacco (2023)  Other substances: occasional weed, never IVDU  Sexual Hx: in relationship, male  Sexual activity: yes with one partner (with multiple partners in [place] prior to this), unclear if current partner has any history of STIs (but has tested HIV-negative)  Adherence to condoms: intermittently  History of STI diagnosis and treatment: none prior    Prior PrEP: was on Descovy last in , but was only on it for a year and then discontinued when lost his job/insurance      PAST MEDICAL HISTORY  Otherwise as per HPI    PAST SURGICAL HISTORY  Otherwise as per HPI    ALLERGIES AND DRUG REACTIONS  No Known Allergies    FAMILY HISTORY  No known immunocompromising conditions or infections unless listed below  family history is not on file.    SOCIAL AND FUNCTIONAL HISTORY  Social History     Tobacco Use    Smoking status: Former     Years: 1     Types: Cigarettes     Quit date: 2023     Years since quittin.3    Smokeless tobacco: Never    Tobacco comments:     Did vaping for 2 years    Vaping Use    Vaping Use: Former    Quit date: 2023    Substances: THC, Flavoring    Devices: Disposable     Otherwise as per HPI    CURRENT ANTIBIOTICS  Other medications reviewed in EPIC  n/a    REVIEW OF SYSTEMS  ROS  obtained, pertinent positives and negatives as above.    Objective   PHYSICAL EXAMINATION  /78   Pulse 93   Temp 98.3  F (36.8  C) (Oral)   SpO2 98%     Constitutional:  appearance not in distress, appears comfortable   Eyes: no conjunctival injection, no scleral icterus  ENT: no nasal discharge, membranes moist, oropharynx pink and without exudates or thrush, +mild tonsillar enlargement  Cardiovascular: regular rate and rhythm  Pulmonary: unlabored breathing, clear to ascultation bilaterally  Gastrointestinal: abdomen non-distended, soft, non-tender   Lymphatic: +mild cervical lymphadenopathy (non-tender  Extremities: warm and well perfused   Skin: no rashes  Neurologic: awake, alert and interactive      Other Significant Information (Labs, cultures, radiology, etc)    Recent micro reviewed:   9/18/23 syphilis screen: negative  9/18/23 HCV screen: negative  9/18/23 gonorrhea/chlamydia urine: negative  9/18/23 HIV screen: positive  9/18/23 HBsAg non-reactive  11/2/23 HIV VL: 188k  11/2/23 CD4 count 382 (12%)  11/2/23 syphilis screen: negative

## 2023-11-10 ENCOUNTER — TELEPHONE (OUTPATIENT)
Dept: INFECTIOUS DISEASES | Facility: CLINIC | Age: 28
End: 2023-11-10

## 2023-11-10 LAB
C TRACH DNA SPEC QL NAA+PROBE: NEGATIVE
C TRACH DNA SPEC QL NAA+PROBE: POSITIVE
N GONORRHOEA DNA SPEC QL NAA+PROBE: NEGATIVE
N GONORRHOEA DNA SPEC QL NAA+PROBE: NEGATIVE

## 2023-11-10 NOTE — TELEPHONE ENCOUNTER
[facility] Call Center    Phone Message    May a detailed message be left on voicemail: yes     Reason for Call: Other: Follow up appointment and Short term disability form     Pt wondering if Dr. Person would like to work the Pt in to be seen sooner than 12/13/23 or if that date is okay? Pt is concern for the new medication Dr. Person prescribe will be out before the appt on 12/13/23. Pt was added to the wait list.      Pt mention he did not get to discuss regarding short term disability form and would like a nurse to contact him back to see if  Dr. Person wants Pt to still get the Short term disability forms sent over to Dr. Person office?    Please contact the Pt back to let him know.    Action Taken: Message routed to:  [facility] ([facility] Adult ID

## 2023-11-13 NOTE — TELEPHONE ENCOUNTER
Requested staff to call patient ok to send paperwork and the 12/13 appt is ok.       J Carlos Villatoro RN  Infectious Disease on 11/13/2023 at 8:09 AM

## 2023-11-14 ENCOUNTER — OFFICE VISIT (OUTPATIENT)
Dept: FAMILY MEDICINE | Facility: CLINIC | Age: 28
End: 2023-11-14
Payer: COMMERCIAL

## 2023-11-14 ENCOUNTER — MYC MEDICAL ADVICE (OUTPATIENT)
Dept: PHARMACY | Facility: CLINIC | Age: 28
End: 2023-11-14

## 2023-11-14 ENCOUNTER — VIRTUAL VISIT (OUTPATIENT)
Dept: PHARMACY | Facility: CLINIC | Age: 28
End: 2023-11-14
Payer: COMMERCIAL

## 2023-11-14 VITALS
RESPIRATION RATE: 16 BRPM | TEMPERATURE: 98 F | BODY MASS INDEX: 23.04 KG/M2 | OXYGEN SATURATION: 100 % | WEIGHT: 130 LBS | HEART RATE: 68 BPM | HEIGHT: 63 IN | DIASTOLIC BLOOD PRESSURE: 72 MMHG | SYSTOLIC BLOOD PRESSURE: 122 MMHG

## 2023-11-14 DIAGNOSIS — K21.9 GASTROESOPHAGEAL REFLUX DISEASE, UNSPECIFIED WHETHER ESOPHAGITIS PRESENT: ICD-10-CM

## 2023-11-14 DIAGNOSIS — F33.0 MILD EPISODE OF RECURRENT MAJOR DEPRESSIVE DISORDER (H): ICD-10-CM

## 2023-11-14 DIAGNOSIS — B00.9 HSV (HERPES SIMPLEX VIRUS) INFECTION: ICD-10-CM

## 2023-11-14 DIAGNOSIS — A74.9 CHLAMYDIA INFECTION: ICD-10-CM

## 2023-11-14 DIAGNOSIS — F41.9 ANXIETY: ICD-10-CM

## 2023-11-14 DIAGNOSIS — R06.83 SNORING: ICD-10-CM

## 2023-11-14 DIAGNOSIS — Z21 ASYMPTOMATIC HIV INFECTION, WITH NO HISTORY OF HIV-RELATED ILLNESS (H): Primary | ICD-10-CM

## 2023-11-14 DIAGNOSIS — Z21 HIV TEST POSITIVE (H): Primary | ICD-10-CM

## 2023-11-14 DIAGNOSIS — F41.1 GAD (GENERALIZED ANXIETY DISORDER): ICD-10-CM

## 2023-11-14 PROCEDURE — 99605 MTMS BY PHARM NP 15 MIN: CPT | Performed by: PHARMACIST

## 2023-11-14 PROCEDURE — 90686 IIV4 VACC NO PRSV 0.5 ML IM: CPT | Performed by: FAMILY MEDICINE

## 2023-11-14 PROCEDURE — 90480 ADMN SARSCOV2 VAC 1/ONLY CMP: CPT | Performed by: FAMILY MEDICINE

## 2023-11-14 PROCEDURE — 99213 OFFICE O/P EST LOW 20 MIN: CPT | Mod: 25 | Performed by: FAMILY MEDICINE

## 2023-11-14 PROCEDURE — 99607 MTMS BY PHARM ADDL 15 MIN: CPT | Performed by: PHARMACIST

## 2023-11-14 PROCEDURE — 90471 IMMUNIZATION ADMIN: CPT | Performed by: FAMILY MEDICINE

## 2023-11-14 PROCEDURE — 91320 SARSCV2 VAC 30MCG TRS-SUC IM: CPT | Performed by: FAMILY MEDICINE

## 2023-11-14 RX ORDER — AZITHROMYCIN 500 MG/1
1000 TABLET, FILM COATED ORAL DAILY
Qty: 2 TABLET | Refills: 0 | Status: SHIPPED | OUTPATIENT
Start: 2023-11-14 | End: 2023-11-15

## 2023-11-14 ASSESSMENT — PAIN SCALES - GENERAL: PAINLEVEL: NO PAIN (0)

## 2023-11-14 NOTE — PROGRESS NOTES
"    ICD-10-CM    1. HIV test positive (H)  Z21       2. Anxiety  F41.9 Adult Mental Health  Referral      3. Mild episode of recurrent major depressive disorder (H24)  F33.0 Adult Mental Health  Referral      4. Chlamydia infection  A74.9 azithromycin (ZITHROMAX) 500 MG tablet      5. Snoring  R06.83         New diagnosis of HIV, was difficult to deal with but now he feels like he is doing ok, he did see ID recently and has had his questions answered.  He has a follow up appoint coming up    Advised therapy, referral placed, he will aslo try to call his insurance for other providers for therapy if ours is not covered.,  declined meds for now, close monitoring of mental health     History of snoring and enlarged tonsils, recent throat swab done by ID , reviewed and was positive for chlamydia, treat with azithro for compliance purposes, he will contact his sexual partners to be treated as well.  He will also contact ID to make sure there is no additional treatment needed.     Sameer Ortiz is a 28 year old, presenting for the following health issues:  Follow Up        11/14/2023     9:10 AM   Additional Questions   Roomed by Kerrie       History of Present Illness       Reason for visit:  Follow up    He eats 2-3 servings of fruits and vegetables daily.He consumes 2 sweetened beverage(s) daily.        Recent diagnosis of HIV  He feels like mentally he was drained after his new diagnosis, but now coping better  He is off his lexapro as he did not think it made a lot of difference        Review of Systems   Constitutional, HEENT, cardiovascular, pulmonary, GI, , musculoskeletal, neuro, skin, endocrine and psych systems are negative, except as otherwise noted.      Objective    /72   Pulse 68   Temp 98  F (36.7  C) (Oral)   Resp 16   Ht 1.6 m (5' 3\")   Wt 59 kg (130 lb)   SpO2 100%   BMI 23.03 kg/m    Body mass index is 23.03 kg/m .  Physical Exam   GENERAL: healthy, alert and no " distress  NECK: no adenopathy, no asymmetry, masses, or scars and thyroid normal to palpation  RESP: lungs clear to auscultation - no rales, rhonchi or wheezes  CV: regular rate and rhythm, normal S1 S2, no S3 or S4, no murmur, click or rub, no peripheral edema and peripheral pulses strong  ABDOMEN: soft, nontender, no hepatosplenomegaly, no masses and bowel sounds normal  MS: no gross musculoskeletal defects noted, no edema

## 2023-11-14 NOTE — PROGRESS NOTES
Medication Therapy Management (MTM) Encounter    ASSESSMENT:                            Medication Adherence/Access: No issues identified    HIV:   Reviewed drug interactions, missed dose management, and risk of resistance with missed doses. Encouraged adherence.    STI:   Can take azithromycin; take with food    GERD:   Could try reducing to once daily and taking daily for 30 days to see if symptoms resolve; starting antiretroviral therapy could help symptoms as well. Famotidine may work better when taken as needed if he prefers as needed therapy.    HSV:   Stable    Depression/Anxiety:  No drug interactions with Biktarvy    PLAN:                            Take azithromycin 1g once with dinner to treat Chlamydia (okay to take tonight)  Can try taking omeprazole 20 mg once daily to see if more effective for symptoms. If not effective, could be caused by something else.    If interested in re-starting escitalopram and buspirone, let your provider know to get new prescriptions. These do not interact with Biktarvy.    Follow-up: as needed    SUBJECTIVE/OBJECTIVE:                          Angel Simeon is a 28 year old male called for an initial visit. He was referred to me from Dr. Person.      Reason for visit: antiretroviral therapy .    Allergies/ADRs: Reviewed in chart  Past Medical History: Reviewed in chart  Tobacco: He reports that he quit smoking about 4 months ago. His smoking use included cigarettes. He has never used smokeless tobacco.  Alcohol: once every 2 weeks  Medication Adherence/Access: no issues. Taking Biktarvy in the morning. Using alarm.    HIV:   Biktarvy once daily - started 11/10  Side effects: thinks digestion is getting better since starting it. Occasional nausea. He is taking it with food.   Diagnosis: 11/2023  Past regimens: none  Genotype:   HIV VL: 188,000 on 11/2/23  CD4: 382 on 11/2/23  Treponema Ab:   Immunizations: immune to hepA, not immune to hepB (core Ab and surface Ag negative)  but 3/3 vaccines. Due for PCV20, MenQuadfi    STI:   Positive chlamydia throat swab on 11/9/23 - received azithromycin prescription 1000 mg x1 dose to treat but hasn't taken yet.     GERD:   Omeprazole 20 mg once daily - takes as needed   Patient reports upper abdominal discomfort. No specific food triggers but maybe tomatoes  Patient feels that current regimen is not effective.    HSV:   Acyclovir 400 mg every 8 hours as needed for outbreaks  Not currently taking    Depression/Anxiety:  Escitalopram 10 mg once daily. - not currently taking  Buspirone -not currently taking  Wonders if these interact with Biktarvy        10/10/2023     6:41 AM 10/12/2023     2:22 PM 10/31/2023     9:23 AM   PHQ-9 SCORE   PHQ-9 Total Score MyChart 7 (Mild depression)     PHQ-9 Total Score 7 16 9         10/10/2023     6:42 AM 10/12/2023     2:22 PM 10/31/2023     9:23 AM   HUGO-7 SCORE   Total Score 7 (mild anxiety)     Total Score 7 15 6       Today's Vitals: There were no vitals taken for this visit.  ----------------    I spent 20 minutes with this patient today. All changes were made via collaborative practice agreement with Dr. Person. A copy of the visit note was provided to the patient's provider(s).    A summary of these recommendations was sent via [employer].    Telemedicine Visit Details  Type of service:  Telephone visit  Start Time:  1:08 pm  End Time:  1:28 pm     Medication Therapy Recommendations  Gastroesophageal reflux disease, unspecified whether esophagitis present    Current Medication: omeprazole (PRILOSEC) 20 MG  capsule   Rationale: Does not understand instructions - Adherence - Adherence   Recommendation: Provide Education   Status: Patient Agreed - Adherence/Education

## 2023-11-14 NOTE — PATIENT INSTRUCTIONS
Follow up with mental health provider  Start antibiotics  Contact your ID provider  Retest throat culture if tonsils are enlarged  Davon Wilson D.O.          Patient Education

## 2023-11-14 NOTE — PATIENT INSTRUCTIONS
"Recommendations from today's MTM visit:                                                      Take azithromycin 1g once with dinner to treat Chlamydia (okay to take tonight)  Can try taking omeprazole 20 mg once daily to see if more effective for symptoms. If not effective, could be caused by something else.    If interested in re-starting escitalopram and buspirone, let your provider know to get new prescriptions. These do not interact with Biktarvy.    Follow-up: as needed    It was great speaking with you today.  I value your experience and would be very thankful for your time in providing feedback in our clinic survey. In the next few days, you may receive an email or text message from [facility] [place] [employer] with a link to a survey related to your  clinical pharmacist.\"     To schedule another MTM appointment, please call the clinic directly or you may call the MTM scheduling line at [phone] or toll-free at [phone].     My Clinical Pharmacist's contact information:                                                      Please feel free to contact me with any questions or concerns you have.      Gilma Nj, PharmD, AAHIVP  Medication Therapy Management Pharmacist     "

## 2023-11-15 ENCOUNTER — TELEPHONE (OUTPATIENT)
Dept: INFECTIOUS DISEASES | Facility: CLINIC | Age: 28
End: 2023-11-15

## 2023-11-15 NOTE — TELEPHONE ENCOUNTER
Brief telephone note -     Called [phone] and spoke to Angel (pronounced with one syllable) about his positive chlamydia result and treatment -- patient says he already spoke to Gilma and has azithromycin at the pharmacy which he will  today and take. He otherwise started taking the Biktarvy last week (takes every morning) without any issues aside from some nausea which he says is tolerable. Will plan to see again in ID clinic next month with repeat labs (including GC/CT swabs/urine) at that time.

## 2023-11-19 ENCOUNTER — MYC MEDICAL ADVICE (OUTPATIENT)
Dept: INFECTIOUS DISEASES | Facility: CLINIC | Age: 28
End: 2023-11-19

## 2023-11-21 ENCOUNTER — E-VISIT (OUTPATIENT)
Dept: FAMILY MEDICINE | Facility: CLINIC | Age: 28
End: 2023-11-21
Payer: COMMERCIAL

## 2023-11-21 DIAGNOSIS — F33.0 MILD EPISODE OF RECURRENT MAJOR DEPRESSIVE DISORDER (H): Primary | ICD-10-CM

## 2023-11-21 PROCEDURE — 99207 PR NO BILLABLE SERVICE THIS VISIT: CPT | Performed by: FAMILY MEDICINE

## 2023-11-21 ASSESSMENT — ANXIETY QUESTIONNAIRES
GAD7 TOTAL SCORE: 21
1. FEELING NERVOUS, ANXIOUS, OR ON EDGE: NEARLY EVERY DAY
2. NOT BEING ABLE TO STOP OR CONTROL WORRYING: NEARLY EVERY DAY
7. FEELING AFRAID AS IF SOMETHING AWFUL MIGHT HAPPEN: NEARLY EVERY DAY
3. WORRYING TOO MUCH ABOUT DIFFERENT THINGS: NEARLY EVERY DAY
4. TROUBLE RELAXING: NEARLY EVERY DAY
6. BECOMING EASILY ANNOYED OR IRRITABLE: NEARLY EVERY DAY
GAD7 TOTAL SCORE: 21
5. BEING SO RESTLESS THAT IT IS HARD TO SIT STILL: NEARLY EVERY DAY

## 2023-11-21 ASSESSMENT — PATIENT HEALTH QUESTIONNAIRE - PHQ9
10. IF YOU CHECKED OFF ANY PROBLEMS, HOW DIFFICULT HAVE THESE PROBLEMS MADE IT FOR YOU TO DO YOUR WORK, TAKE CARE OF THINGS AT HOME, OR GET ALONG WITH OTHER PEOPLE: VERY DIFFICULT
SUM OF ALL RESPONSES TO PHQ QUESTIONS 1-9: 26
SUM OF ALL RESPONSES TO PHQ QUESTIONS 1-9: 26

## 2023-11-21 NOTE — PATIENT INSTRUCTIONS
Depression and Chronic Disease: Care Instructions  Overview     A chronic disease is one that you have for a long time. Some chronic diseases can be managed, but they usually cannot be cured. Depression is common in people with chronic diseases.  If you have depression, it's not your fault. Depression is a common mental health condition. And it may get better with treatment. Medicines, counseling, and self-care can all help.  Follow-up care is a key part of your treatment and safety. Be sure to make and go to all appointments, and call your doctor if you are having problems. It's also a good idea to know your test results and keep a list of the medicines you take.  How can you care for yourself at home?  Watch for symptoms of depression  The symptoms of depression are often subtle at first. You may think they are caused by your disease rather than depression. Or you may think it is normal to be depressed when you have a chronic disease.  If you are depressed you may:  Feel sad or hopeless.  Feel guilty or worthless.  Not enjoy the things you used to enjoy.  Feel hopeless, as though life is not worth living.  Have trouble thinking or remembering.  Have low energy, and you may not eat or sleep well.  Pull away from others.  Think often about death or killing yourself.  Get treatment  By treating your depression, you can feel more hopeful and have more energy. If you feel better, you may take better care of yourself, so your health may improve.  Talk to your doctor if you have any changes in mood during treatment for your disease.  Ask your doctor for help. Counseling, antidepressant medicine, or a combination of the two can help most people with depression. Often a combination works best. Counseling can also help you cope with having a chronic disease.  Where to get help 24 hours a day, 7 days a week   If you or someone you know talks about suicide, self-harm, a mental health crisis, a substance use crisis, or any  "other kind of emotional distress, get help right away. You can:  Call the Suicide and Crisis Lifeline at 988.  Call 2-945-985-TALK (1-742.150.8154).  Text HOME to 968089 to access the Crisis Text Line.  Consider saving these numbers in your phone.  Go to [employer] for more information or to chat online.  When should you call for help?   Call 911 anytime you think you may need emergency care. For example, call if:    You feel like hurting yourself or someone else.     Someone you know has depression and is about to attempt or is attempting suicide.   Where to get help 24 hours a day, 7 days a week   If you or someone you know talks about suicide, self-harm, a mental health crisis, a substance use crisis, or any other kind of emotional distress, get help right away. You can:    Call the Suicide and Crisis Lifeline at 988.     Call 3-025-239-TALK (1-259.795.8841).     Text HOME to 965171 to access the Crisis Text Line.   Consider saving these numbers in your phone.  Go to [employer] for more information or to chat online.  Call your doctor now or seek immediate medical care if:    You hear voices.     Someone you know has depression and:  Starts to give away possessions.  Uses illegal drugs or drinks alcohol heavily.  Talks or writes about death, including writing suicide notes or talking about guns, knives, or pills.  Starts to spend a lot of time alone.  Acts very aggressively or suddenly appears calm.   Watch closely for changes in your health, and be sure to contact your doctor if:    You do not get better as expected.   Where can you learn more?  Go to https://www.[employer].net/patiented  Enter A548 in the search box to learn more about \"Depression and Chronic Disease: Care Instructions.\"  Current as of: June 25, 2023               Content Version: 13.8    3196-2413 [employer].   Care instructions adapted under license by your healthcare professional. If you have questions about a medical " condition or this instruction, always ask your healthcare professional. [employer] disclaims any warranty or liability for your use of this information.

## 2023-11-22 ASSESSMENT — PATIENT HEALTH QUESTIONNAIRE - PHQ9: SUM OF ALL RESPONSES TO PHQ QUESTIONS 1-9: 26

## 2023-11-22 ASSESSMENT — ANXIETY QUESTIONNAIRES: GAD7 TOTAL SCORE: 21

## 2023-11-29 ENCOUNTER — MYC REFILL (OUTPATIENT)
Dept: FAMILY MEDICINE | Facility: CLINIC | Age: 28
End: 2023-11-29

## 2023-11-29 DIAGNOSIS — A74.9 CHLAMYDIA INFECTION: ICD-10-CM

## 2023-11-29 DIAGNOSIS — B00.9 HERPES SIMPLEX VIRUS INFECTION: ICD-10-CM

## 2023-11-29 RX ORDER — AZITHROMYCIN 500 MG/1
TABLET, FILM COATED ORAL
Qty: 2 TABLET | Refills: 0 | OUTPATIENT
Start: 2023-11-29

## 2023-11-30 RX ORDER — ACYCLOVIR 400 MG/1
400 TABLET ORAL EVERY 8 HOURS
Qty: 15 TABLET | Refills: 1 | Status: SHIPPED | OUTPATIENT
Start: 2023-11-30 | End: 2023-12-24

## 2023-11-30 NOTE — TELEPHONE ENCOUNTER
RN called patient     He mistakenly requested this instead of the acyclovir.     He does not need this one.    Rosalinda Cordova RN

## 2023-12-05 ENCOUNTER — MYC MEDICAL ADVICE (OUTPATIENT)
Dept: FAMILY MEDICINE | Facility: CLINIC | Age: 28
End: 2023-12-05

## 2023-12-06 ENCOUNTER — VIRTUAL VISIT (OUTPATIENT)
Dept: FAMILY MEDICINE | Facility: CLINIC | Age: 28
End: 2023-12-06
Payer: COMMERCIAL

## 2023-12-06 DIAGNOSIS — F41.1 GAD (GENERALIZED ANXIETY DISORDER): ICD-10-CM

## 2023-12-06 DIAGNOSIS — F33.0 MILD EPISODE OF RECURRENT MAJOR DEPRESSIVE DISORDER (H): Primary | ICD-10-CM

## 2023-12-06 PROCEDURE — 99213 OFFICE O/P EST LOW 20 MIN: CPT | Mod: VID | Performed by: FAMILY MEDICINE

## 2023-12-06 NOTE — LETTER
December 6, 2023      Angel Simeon  [address]    [facility] [address]        To Whom It May Concern:    Angel Simeon was seen on December 6, 2023.  Due to mental health condition, patient would like to work from home for 3 months starting Jan 1st and I support his decision to work from home while he gets treatment.   He would like to return to the office on March 31st 2024.            Sincerely,          Davon Wilson DO

## 2023-12-06 NOTE — PROGRESS NOTES
Angel is a 28 year old who is being evaluated via a billable video visit.      How would you like to obtain your AVS? MyChart  If the video visit is dropped, the invitation should be resent by: Text to cell phone: [phone]  Will anyone else be joining your video visit? No            ICD-10-CM    1. Mild episode of recurrent major depressive disorder (H24)  F33.0 FLUoxetine (PROZAC) 20 MG capsule      2. HUGO (generalized anxiety disorder)  F41.1         Doing ok, started on prozac few weeks ago, no side effects, doing therapy as well.  He recently had a diagnosis of HIV as well.  Coping ok but feels like after his short term leave he would do better if he works from home until march.  He is working with psych (yahir in few days) and therapist.  His work apparently told him he just needs a letter to support it, letter written with patient.  If needing detailed form he may ask psych to fill it out as he is seenig them next week.  Patient thinks it would just be this letter as told by his HR person        Subjective   Angel is a 28 year old, presenting for the following health issues:  Letter for School/Work      12/6/2023    10:31 AM   Additional Questions   Roomed by Kerrie     Looking to get a letter from a doctor about work. My anxiety and just overall mental health has been down. I ll be returning to work shortly, and I would like to submit an accommodation to my employer and what my employer would like is a note from the doctor or overall the notes of my recent diagnosis. The accommodation is for me to be able to work from home full time, my employer doesn t have a problem with this however a note is required for HR purposes if that makes sense.    Recently started no prozac doing well  He does have therapy  He is seeing psych in 3 days to make sure he is on the right meds    He is on disability for a month and wants to go abck to work from home and wants a letter     History of Present Illness       Reason  for visit:  Work accomadation letter due to mental health issues            Review of Systems   Constitutional, HEENT, cardiovascular, pulmonary, gi and gu systems are negative, except as otherwise noted.      Objective           Vitals:  No vitals were obtained today due to virtual visit.    Physical Exam   GENERAL: Healthy, alert and no distress  EYES: Eyes grossly normal to inspection.  No discharge or erythema, or obvious scleral/conjunctival abnormalities.  RESP: No audible wheeze, cough, or visible cyanosis.  No visible retractions or increased work of breathing.    SKIN: Visible skin clear. No significant rash, abnormal pigmentation or lesions.  NEURO: Cranial nerves grossly intact.  Mentation and speech appropriate for age.  PSYCH: Mentation appears normal, affect normal/bright, judgement and insight intact, normal speech and appearance well-groomed.      Video-Visit Details    Type of service:  Video Visit   Video Start Time:  5:00pm  Video End Time:5:18 PM    Originating Location (pt. Location): Home    Distant Location (provider location):  On-site  Platform used for Video Visit: Khari

## 2023-12-13 ENCOUNTER — LAB (OUTPATIENT)
Dept: LAB | Facility: CLINIC | Age: 28
End: 2023-12-13
Attending: INTERNAL MEDICINE
Payer: COMMERCIAL

## 2023-12-13 ENCOUNTER — OFFICE VISIT (OUTPATIENT)
Dept: INFECTIOUS DISEASES | Facility: CLINIC | Age: 28
End: 2023-12-13
Attending: INTERNAL MEDICINE
Payer: COMMERCIAL

## 2023-12-13 VITALS
BODY MASS INDEX: 23.03 KG/M2 | WEIGHT: 130 LBS | OXYGEN SATURATION: 97 % | DIASTOLIC BLOOD PRESSURE: 80 MMHG | SYSTOLIC BLOOD PRESSURE: 137 MMHG | HEART RATE: 93 BPM

## 2023-12-13 DIAGNOSIS — Z21 HIV TEST POSITIVE (H): ICD-10-CM

## 2023-12-13 DIAGNOSIS — Z21 ASYMPTOMATIC HUMAN IMMUNODEFICIENCY VIRUS (HIV) INFECTION STATUS (H): Primary | ICD-10-CM

## 2023-12-13 DIAGNOSIS — Z21 ASYMPTOMATIC HUMAN IMMUNODEFICIENCY VIRUS (HIV) INFECTION STATUS (H): ICD-10-CM

## 2023-12-13 LAB
ALBUMIN SERPL BCG-MCNC: 4.7 G/DL (ref 3.5–5.2)
ALP SERPL-CCNC: 63 U/L (ref 40–150)
ALT SERPL W P-5'-P-CCNC: 16 U/L (ref 0–70)
ANION GAP SERPL CALCULATED.3IONS-SCNC: 10 MMOL/L (ref 7–15)
AST SERPL W P-5'-P-CCNC: 20 U/L (ref 0–45)
BILIRUB SERPL-MCNC: 0.8 MG/DL
BUN SERPL-MCNC: 8 MG/DL (ref 6–20)
CALCIUM SERPL-MCNC: 9.3 MG/DL (ref 8.6–10)
CHLORIDE SERPL-SCNC: 105 MMOL/L (ref 98–107)
CREAT SERPL-MCNC: 0.74 MG/DL (ref 0.67–1.17)
DEPRECATED HCO3 PLAS-SCNC: 24 MMOL/L (ref 22–29)
EGFRCR SERPLBLD CKD-EPI 2021: >90 ML/MIN/1.73M2
ERYTHROCYTE [DISTWIDTH] IN BLOOD BY AUTOMATED COUNT: 14.8 % (ref 10–15)
GLUCOSE SERPL-MCNC: 105 MG/DL (ref 70–99)
HCT VFR BLD AUTO: 42 % (ref 40–53)
HGB BLD-MCNC: 14.6 G/DL (ref 13.3–17.7)
MCH RBC QN AUTO: 31 PG (ref 26.5–33)
MCHC RBC AUTO-ENTMCNC: 34.8 G/DL (ref 31.5–36.5)
MCV RBC AUTO: 89 FL (ref 78–100)
PLATELET # BLD AUTO: 228 10E3/UL (ref 150–450)
POTASSIUM SERPL-SCNC: 3.8 MMOL/L (ref 3.4–5.3)
PROT SERPL-MCNC: 8.5 G/DL (ref 6.4–8.3)
RBC # BLD AUTO: 4.71 10E6/UL (ref 4.4–5.9)
SODIUM SERPL-SCNC: 139 MMOL/L (ref 135–145)
WBC # BLD AUTO: 6.6 10E3/UL (ref 4–11)

## 2023-12-13 PROCEDURE — G0009 ADMIN PNEUMOCOCCAL VACCINE: HCPCS | Performed by: INTERNAL MEDICINE

## 2023-12-13 PROCEDURE — G0463 HOSPITAL OUTPT CLINIC VISIT: HCPCS | Mod: 25 | Performed by: INTERNAL MEDICINE

## 2023-12-13 PROCEDURE — 87491 CHLMYD TRACH DNA AMP PROBE: CPT | Performed by: INTERNAL MEDICINE

## 2023-12-13 PROCEDURE — 87904 PHENOTYPE DNA HIV W/CLT ADD: CPT | Mod: 90 | Performed by: PATHOLOGY

## 2023-12-13 PROCEDURE — 250N000011 HC RX IP 250 OP 636: Performed by: INTERNAL MEDICINE

## 2023-12-13 PROCEDURE — 99000 SPECIMEN HANDLING OFFICE-LAB: CPT | Performed by: PATHOLOGY

## 2023-12-13 PROCEDURE — 87536 HIV-1 QUANT&REVRSE TRNSCRPJ: CPT | Performed by: INTERNAL MEDICINE

## 2023-12-13 PROCEDURE — 87901 NFCT AGT GNTYP ALYS HIV1 REV: CPT | Mod: 90 | Performed by: PATHOLOGY

## 2023-12-13 PROCEDURE — 86481 TB AG RESPONSE T-CELL SUSP: CPT | Performed by: INTERNAL MEDICINE

## 2023-12-13 PROCEDURE — 80053 COMPREHEN METABOLIC PANEL: CPT | Performed by: PATHOLOGY

## 2023-12-13 PROCEDURE — 90677 PCV20 VACCINE IM: CPT | Performed by: INTERNAL MEDICINE

## 2023-12-13 PROCEDURE — 36415 COLL VENOUS BLD VENIPUNCTURE: CPT | Performed by: PATHOLOGY

## 2023-12-13 PROCEDURE — 85027 COMPLETE CBC AUTOMATED: CPT | Performed by: PATHOLOGY

## 2023-12-13 PROCEDURE — 99214 OFFICE O/P EST MOD 30 MIN: CPT | Performed by: INTERNAL MEDICINE

## 2023-12-13 PROCEDURE — 87591 N.GONORRHOEAE DNA AMP PROB: CPT | Performed by: INTERNAL MEDICINE

## 2023-12-13 PROCEDURE — 87903 PHENOTYPE DNA HIV W/CULTURE: CPT | Mod: 90 | Performed by: PATHOLOGY

## 2023-12-13 RX ADMIN — PNEUMOCOCCAL 20-VALENT CONJUGATE VACCINE 0.5 ML
2.2; 2.2; 2.2; 2.2; 2.2; 2.2; 2.2; 2.2; 2.2; 2.2; 2.2; 2.2; 2.2; 2.2; 2.2; 2.2; 4.4; 2.2; 2.2; 2.2 INJECTION, SUSPENSION INTRAMUSCULAR at 14:41

## 2023-12-13 NOTE — PROGRESS NOTES
"Angel Simeon is here today for follow up of HIV care.    Assessment & Plan/Recommendations     ID problem list:  1. Recently diagnosed HIV, now on antiretroviral therapy    Recs:  1.  HIV infection  - continue on Biktarvy 1 tab daily (gets it filled at [facility] on [address]) - already has refills at pharmacy  - sent repeat monitoring labs today; will also send for gonorrhea/chlamydia swabs today; plan to repeat prior to next visit  - follow up in approx 3 month with repeat labs at that time    2. Health maintenance  - continue to follow with primary care provider (Dr. Wilson) and psych specialists for remaining management  - plans to get Prevnar-20 today; will discuss further vaccines at next visit (meningococcal, HPV, Jynneos, etc)      Colonoscopy: n/a  GC/Chlamydia: due  Syphilis: neg 11/2023  Quantiferon: negative 12/13/23  G6PD:   HLA-: neg 11/2/23  Crypto (if CD4<50):   Toxo IgG: nonreactive 11/2023  CMV IgG: reactive 11/2023  Hep A antibody : reactive 11/2023  Hep B antibody: HBsAg non-reactive, HBsAb reactive, HBcAB non-reactive 11/2023  Hep C antibody: non-reactivfe 11/2023  Hep A vaccine: received 0685-3721  Hep B vaccine: received 0484-2394  Tdap vaccine: 10/31/23  Flu vaccine: last 2019, 11/14/23  Prevnar PCV 20 conj vaccine: 12/13/23   Pneumovax-23 PPSV poly vaccine:   Meningococcal MenACWY (Menveo, Menactra) vaccine: 2011  HPV vaccine: 2011, 2023, ?  MMR vaccine: 2003, 2011  Varicella vaccine: 2003, 2011  COVID-19 vaccine: 2019, 11/14/23    I communicated with the patient at this visit.      Patient was seen on 12/13/23 in ID clinic.    35 minutes spent by me on the date of the encounter doing chart review, history and exam, documentation and further activities per the note    Juan Person MD  Infectious Diseases      Subjective       HPI:    As per initial ID consult note:  \"Angel Simeon is a 28 year old male with PMH including anxiety/depression, GERD, HSV infection, prior smoking, " "and recent diagnosis of HIV infection (10/2023 at primary care screening), who presents to ID clinic for HIV care.    Per patient report, doing ok since his diagnosis last month. Has not started any antiretroviral therapy yet.  Was not feeling well having abdominal discomfort (comes/goes) at time of HIV screening, did also go to hospital 10/12 (was given omeprazole which didn't change his symptoms much) who referred him to PCP for further work-up. Occasional nausea, no vomiting, occasional loose stool (non-watery, non-bloody). No fevers/chills, (in 8/2023 had 2 wks of cough, phlegm, fevers that self-limiting and resolved), +night sweats (since 8/2023) non-drenching and intermittent (~1x/week), no weight change, no current rashes, mild cervical gland swelling, no sore throat, no issues swallowing, intermittent headaches, no groin/anus discharge or lesions. In 6/2023 went to ED in [facility] after fainting, no official diagnosis at that time, just was given potassium and fluids, unsure if they screened for HIV at that time.     Thinks he has a good support system. Family lives in [facility], boyfriend lives here. Remaining social history as noted below.\"    Interval events/updates:  12/13/23 update: Since last visit did take azithromycin dosed for chlamydia infection (was deemed at that time that a single-dose med would be preferable at that time compared to weeks-long doxycycline in order to promote patient compliance). Since last visit - he talked with his PCP Dr. Wilson and is trying to get set up with a therapist who is an LGBT provider ([facility]) for anxiety, was seeing a different therapist previously. Has been taking daily Biktarvy without missing any doses since last visit -- doing pill organizer in the mornings. Occasional bloating, not currently. Does take omeprazole and metamucil; does also occasionally take Tums (never at same time as Biktarvy), not on any multivitamin.  On ROS - no " fevers/chills, intermittent non-drenching night sweats (used to be daily, now is intermittent a few times a week), no cough/SOB, no diarrhea. No sore throat. No swollen glands. No discharge/lesions in groin or anus. Has been sexually active only with partner, he knows about diagnosis (is on PrEP) and who has also already been screened for STIs since last visit.  Did just have wisdom teeth removed ~2 weeks ago 11/28 without issues.     HIV past medical history:  Diagnosed: 10/2023  Approximate date of transmission:   Risk factors: sexual/MSM  Kashif CD4: 382 (11/2023)  Viral load at time of diagnosis: 188,000  Genotypes/mutation history: ordered but not resulted as of 11/2023  Treatment history: Biktarvy (11/2023-)  Prior OIs: none known  TB screening: never diagnosed/treated; previously screened 2019 negative for immigration status change    ART Adherence:  Current regimen: Biktarvy  Missed doses in last week, month: none  Estimates adherence at: 100%  Medication side effects: none currently (prior had some bloating)  OTC medications:     Social history:  Background: Originally from [place], moved here 7/2023 (for work); moved to  when 7 yo (born in [place])  Lives in/with: lives with boyfriend (he's aware of the diagnosis, he since has tested negative)  Supports: n/a  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, 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)  International travel: none recently - last in 2019 to [place] (3 months)  Pets: none  Alcohol: socially (once every 2 wks)  Tobacco: quit smoking tobacco (2/2023)  Other substances: occasional weed, never IVDU  Sexual Hx: in relationship, male  Sexual activity: yes with one stable partner (with multiple partners in [place] [facility] prior to this), current partner has been tested/negative and has been on PrEP  Adherence to condoms: intermittently  History of STI diagnosis and treatment: none prior    Prior PrEP: was on Descovy last  in , but was only on it for a year and then discontinued when lost his job/insurance      PAST MEDICAL HISTORY  Otherwise as per HPI    PAST SURGICAL HISTORY  Otherwise as per HPI    ALLERGIES AND DRUG REACTIONS  No Known Allergies    FAMILY HISTORY  No known immunocompromising conditions or infections unless listed below  family history is not on file.    SOCIAL AND FUNCTIONAL HISTORY  Social History     Tobacco Use     Smoking status: Former     Years: 1     Types: Cigarettes     Quit date: 2023     Years since quittin.4     Smokeless tobacco: Never     Tobacco comments:     Did vaping for 2 years    Vaping Use     Vaping Use: Former     Quit date: 2023     Substances: THC, Flavoring     Devices: Disposable     Otherwise as per HPI    CURRENT ANTIMICROBIALS  Other medications reviewed in EPIC  Biktarvy 1 tab daily    REVIEW OF SYSTEMS  ROS obtained, pertinent positives and negatives as above.    Objective   PHYSICAL EXAMINATION  /80   Pulse 93   Wt 59 kg (130 lb)   SpO2 97%   BMI 23.03 kg/m      Constitutional:  appearance not in distress, appears comfortable   Eyes: no conjunctival injection, no scleral icterus  ENT: no nasal discharge, membranes moist, oropharynx pink and without exudates or thrush, mild tonsillar enlargement   Cardiovascular: regular rate and rhythm  Pulmonary: unlabored breathing, clear to ascultation bilaterally  Gastrointestinal: abdomen non-distended, soft, non-tender   Lymphatic: no appreciable lymphadenopathy  Extremities: warm and well perfused   Skin: no rashes  Neurologic: awake, alert and interactive      Other Significant Information (Labs, cultures, radiology, etc)    Recent micro reviewed:   23 syphilis screen: negative  23 HCV screen: negative  23 gonorrhea/chlamydia urine: negative  23 HIV screen: positive  23 HBsAg non-reactive  23 HIV VL: 188k  23 CD4 count 382 (12%)  23 syphilis screen: negative  23  chlamydia screen: positive  12/13/23 HIV   12/13/23 gonorrhea/chlamdyia screening (urine/rectal/throat): negative  12/13/23 quantiferon negative

## 2023-12-13 NOTE — LETTER
12/13/2023       RE: Angel Simeon  [address]  [address]  [facility] [address]     Dear Colleague,    Thank you for referring your patient, Angel Simeon, to the [facility] [place] at [facility]. Please see a copy of my visit note below.    Angel Simeon is here today for follow up of HIV care.    Assessment & Plan/Recommendations     ID problem list:  Recently diagnosed HIV, now on antiretroviral therapy    Recs:  1.  HIV infection  - continue on Biktarvy 1 tab daily (gets it filled at [facility] on [address]) - already has refills at pharmacy  - sent repeat monitoring labs today; will also send for gonorrhea/chlamydia swabs today; plan to repeat prior to next visit  - follow up in approx 3 month with repeat labs at that time    2. Health maintenance  - continue to follow with primary care provider (Dr. Wilson) and psych specialists for remaining management  - plans to get Prevnar-20 today; will discuss further vaccines at next visit (meningococcal, HPV, Jynneos, etc)      Colonoscopy: n/a  GC/Chlamydia: due  Syphilis: neg 11/2023  Quantiferon: negative 12/13/23  G6PD:   HLA-: neg 11/2/23  Crypto (if CD4<50):   Toxo IgG: nonreactive 11/2023  CMV IgG: reactive 11/2023  Hep A antibody : reactive 11/2023  Hep B antibody: HBsAg non-reactive, HBsAb reactive, HBcAB non-reactive 11/2023  Hep C antibody: non-reactivfe 11/2023  Hep A vaccine: received 3993-7223  Hep B vaccine: received 7622-3771  Tdap vaccine: 10/31/23  Flu vaccine: last 2019, 11/14/23  Prevnar PCV 20 conj vaccine: 12/13/23   Pneumovax-23 PPSV poly vaccine:   Meningococcal MenACWY (Menveo, Menactra) vaccine: 2011  HPV vaccine: 2011, 2023, ?  MMR vaccine: 2003, 2011  Varicella vaccine: 2003, 2011  COVID-19 vaccine: 2019, 11/14/23    I communicated with the patient at this visit.      Patient was seen on 12/13/23 in ID clinic.    35 minutes spent by me on the  "date of the encounter doing chart review, history and exam, documentation and further activities per the note    Juan Person MD  Infectious Diseases      Subjective       HPI:    As per initial ID consult note:  \"Angel Simeon is a 28 year old male with PMH including anxiety/depression, GERD, HSV infection, prior smoking, and recent diagnosis of HIV infection (10/2023 at primary care screening), who presents to ID clinic for HIV care.    Per patient report, doing ok since his diagnosis last month. Has not started any antiretroviral therapy yet.  Was not feeling well having abdominal discomfort (comes/goes) at time of HIV screening, did also go to hospital 10/12 (was given omeprazole which didn't change his symptoms much) who referred him to PCP for further work-up. Occasional nausea, no vomiting, occasional loose stool (non-watery, non-bloody). No fevers/chills, (in 8/2023 had 2 wks of cough, phlegm, fevers that self-limiting and resolved), +night sweats (since 8/2023) non-drenching and intermittent (~1x/week), no weight change, no current rashes, mild cervical gland swelling, no sore throat, no issues swallowing, intermittent headaches, no groin/anus discharge or lesions. In 6/2023 went to ED in [facility] after fainting, no official diagnosis at that time, just was given potassium and fluids, unsure if they screened for HIV at that time.     Thinks he has a good support system. Family lives in [facility], boyfriend lives here. Remaining social history as noted below.\"    Interval events/updates:  12/13/23 update: Since last visit did take azithromycin dosed for chlamydia infection (was deemed at that time that a single-dose med would be preferable at that time compared to weeks-long doxycycline in order to promote patient compliance). Since last visit - he talked with his PCP Dr. Wilson and is trying to get set up with a therapist who is an LGBT provider ([facility]) for anxiety, was seeing a " different therapist previously. Has been taking daily Biktarvy without missing any doses since last visit -- doing pill organizer in the mornings. Occasional bloating, not currently. Does take omeprazole and metamucil; does also occasionally take Tums (never at same time as Biktarvy), not on any multivitamin.  On ROS - no fevers/chills, intermittent non-drenching night sweats (used to be daily, now is intermittent a few times a week), no cough/SOB, no diarrhea. No sore throat. No swollen glands. No discharge/lesions in groin or anus. Has been sexually active only with partner, he knows about diagnosis (is on PrEP) and who has also already been screened for STIs since last visit.  Did just have wisdom teeth removed ~2 weeks ago 11/28 without issues.     HIV past medical history:  Diagnosed: 10/2023  Approximate date of transmission:   Risk factors: sexual/MSM  Kashif CD4: 382 (11/2023)  Viral load at time of diagnosis: 188,000  Genotypes/mutation history: ordered but not resulted as of 11/2023  Treatment history: Biktarvy (11/2023-)  Prior OIs: none known  TB screening: never diagnosed/treated; previously screened 2019 negative for immigration status change    ART Adherence:  Current regimen: Biktarvy  Missed doses in last week, month: none  Estimates adherence at: 100%  Medication side effects: none currently (prior had some bloating)  OTC medications:     Social history:  Background: Originally from 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 [employer] via [facility] currently, then will transition to [employer]-provided insurance)  International travel: none recently - last in 2019 to [place] (3 months)  Pets: none  Alcohol: socially (once every 2 wks)  Tobacco: quit smoking tobacco (2/2023)  Other substances: occasional weed,  never IVDU  Sexual Hx: in relationship, male  Sexual activity: yes with one stable partner (with multiple partners in [facility] prior to this), current partner has been tested/negative and has been on PrEP  Adherence to condoms: intermittently  History of STI diagnosis and treatment: none prior    Prior PrEP: was on Descovy last in , but was only on it for a year and then discontinued when lost his job/insurance      PAST MEDICAL HISTORY  Otherwise as per HPI    PAST SURGICAL HISTORY  Otherwise as per HPI    ALLERGIES AND DRUG REACTIONS  No Known Allergies    FAMILY HISTORY  No known immunocompromising conditions or infections unless listed below  family history is not on file.    SOCIAL AND FUNCTIONAL HISTORY  Social History     Tobacco Use    Smoking status: Former     Years: 1     Types: Cigarettes     Quit date: 2023     Years since quittin.4    Smokeless tobacco: Never    Tobacco comments:     Did vaping for 2 years    Vaping Use    Vaping Use: Former    Quit date: 2023    Substances: THC, Flavoring    Devices: Disposable     Otherwise as per HPI    CURRENT ANTIMICROBIALS  Other medications reviewed in EPIC  Biktarvy 1 tab daily    REVIEW OF SYSTEMS  ROS obtained, pertinent positives and negatives as above.    Objective   PHYSICAL EXAMINATION  /80   Pulse 93   Wt 59 kg (130 lb)   SpO2 97%   BMI 23.03 kg/m      Constitutional:  appearance not in distress, appears comfortable   Eyes: no conjunctival injection, no scleral icterus  ENT: no nasal discharge, membranes moist, oropharynx pink and without exudates or thrush, mild tonsillar enlargement   Cardiovascular: regular rate and rhythm  Pulmonary: unlabored breathing, clear to ascultation bilaterally  Gastrointestinal: abdomen non-distended, soft, non-tender   Lymphatic: no appreciable lymphadenopathy  Extremities: warm and well perfused   Skin: no rashes  Neurologic: awake, alert and interactive      Other Significant Information (Labs,  cultures, radiology, etc)    Recent micro reviewed:   9/18/23 syphilis screen: negative  9/18/23 HCV screen: negative  9/18/23 gonorrhea/chlamydia urine: negative  9/18/23 HIV screen: positive  9/18/23 HBsAg non-reactive  11/2/23 HIV VL: 188k  11/2/23 CD4 count 382 (12%)  11/2/23 syphilis screen: negative  11/9/23 chlamydia screen: positive  12/13/23 HIV   12/13/23 gonorrhea/chlamdyia screening (urine/rectal/throat): negative  12/13/23 quantiferon negative

## 2023-12-14 LAB
C TRACH DNA SPEC QL NAA+PROBE: NEGATIVE
HIV1 RNA # PLAS NAA DL=20: 418 COPIES/ML
HIV1 RNA SERPL NAA+PROBE-LOG#: 2.6 {LOG_COPIES}/ML
N GONORRHOEA DNA SPEC QL NAA+PROBE: NEGATIVE

## 2023-12-15 LAB
GAMMA INTERFERON BACKGROUND BLD IA-ACNC: 0.11 IU/ML
M TB IFN-G BLD-IMP: NEGATIVE
M TB IFN-G CD4+ BCKGRND COR BLD-ACNC: 9.89 IU/ML
MITOGEN IGNF BCKGRD COR BLD-ACNC: 0.04 IU/ML
MITOGEN IGNF BCKGRD COR BLD-ACNC: 0.04 IU/ML
QUANTIFERON MITOGEN: 10 IU/ML
QUANTIFERON NIL TUBE: 0.11 IU/ML
QUANTIFERON TB1 TUBE: 0.15 IU/ML
QUANTIFERON TB2 TUBE: 0.15

## 2023-12-24 ENCOUNTER — MYC REFILL (OUTPATIENT)
Dept: FAMILY MEDICINE | Facility: CLINIC | Age: 28
End: 2023-12-24

## 2023-12-24 DIAGNOSIS — B00.9 HERPES SIMPLEX VIRUS INFECTION: ICD-10-CM

## 2023-12-26 RX ORDER — ACYCLOVIR 400 MG/1
400 TABLET ORAL EVERY 8 HOURS
Qty: 15 TABLET | Refills: 1 | Status: SHIPPED | OUTPATIENT
Start: 2023-12-26 | End: 2024-02-23

## 2023-12-27 ENCOUNTER — VIRTUAL VISIT (OUTPATIENT)
Dept: FAMILY MEDICINE | Facility: CLINIC | Age: 28
End: 2023-12-27
Payer: COMMERCIAL

## 2023-12-27 ENCOUNTER — LAB (OUTPATIENT)
Dept: LAB | Facility: CLINIC | Age: 28
End: 2023-12-27
Payer: COMMERCIAL

## 2023-12-27 DIAGNOSIS — J20.9 ACUTE BRONCHITIS, UNSPECIFIED ORGANISM: Primary | ICD-10-CM

## 2023-12-27 DIAGNOSIS — J20.9 ACUTE BRONCHITIS, UNSPECIFIED ORGANISM: ICD-10-CM

## 2023-12-27 LAB — DEPRECATED S PYO AG THROAT QL EIA: NEGATIVE

## 2023-12-27 PROCEDURE — 87637 SARSCOV2&INF A&B&RSV AMP PRB: CPT

## 2023-12-27 PROCEDURE — 99213 OFFICE O/P EST LOW 20 MIN: CPT | Mod: VID

## 2023-12-27 PROCEDURE — 87651 STREP A DNA AMP PROBE: CPT | Mod: VID

## 2023-12-27 RX ORDER — BENZONATATE 100 MG/1
100 CAPSULE ORAL 3 TIMES DAILY PRN
Qty: 30 CAPSULE | Refills: 0 | Status: SHIPPED | OUTPATIENT
Start: 2023-12-27 | End: 2024-01-06

## 2023-12-27 ASSESSMENT — ENCOUNTER SYMPTOMS
COUGH: 1
FEVER: 1
FATIGUE: 1
SORE THROAT: 1

## 2023-12-27 ASSESSMENT — PATIENT HEALTH QUESTIONNAIRE - PHQ9
SUM OF ALL RESPONSES TO PHQ QUESTIONS 1-9: 4
SUM OF ALL RESPONSES TO PHQ QUESTIONS 1-9: 4
10. IF YOU CHECKED OFF ANY PROBLEMS, HOW DIFFICULT HAVE THESE PROBLEMS MADE IT FOR YOU TO DO YOUR WORK, TAKE CARE OF THINGS AT HOME, OR GET ALONG WITH OTHER PEOPLE: SOMEWHAT DIFFICULT

## 2023-12-27 NOTE — PROGRESS NOTES
Angel is a 28 year old who is being evaluated via a billable video visit.      How would you like to obtain your AVS? MyChart  If the video visit is dropped, the invitation should be resent by: Text to cell phone: [phone]  Will anyone else be joining your video visit? No          Assessment & Plan     (J20.9) Acute bronchitis, unspecified organism  (primary encounter diagnosis)  Comment: Acute. No concerns for red flag symptoms or need for emergent medical attention. Denies SOB or chest pain. Short duration of symptoms and more mild severity makes viral illness likely. Sore throat, cough, and low grade fever make strep infection possible. Lab testing ordered to guide plan of care. Discussed treatment for strep with course of Abx, and treatment for viral illness being supportive therapy and close monitoring. Patient attested to understanding. If symptoms worsened or did not improve over the coming weeks, may consider chest radiograph or CBC to rule out concerns for pneumonia  Plan: Symptomatic Influenza A/B, RSV, & SARS-CoV2 PCR        (COVID-19) Nasopharyngeal, Streptococcus A         Rapid Screen w/Reflex to PCR - Clinic Collect,         benzonatate (TESSALON) 100 MG capsule            Prescription drug management  I spent a total of 19 minutes on the day of the visit.   Time spent by me doing chart review, history and exam, documentation and further activities per the note       FUTURE APPOINTMENTS:       - Follow-up visit in 1 week if symptoms worsen or do not improve       - Follow-up for annual visit or as needed  Patient Instructions   If your strep test comes back positive, will treat with a course of antibiotics. Please complete the full course of antibiotics regardless of symptom improvement.  This medication may cause some stomach upset, so you can take it with food to prevent this. You will be contagious until you have completed 24 hours of the medication, so avoid coming in close contact with  others, and especially sharing beverages or food.  Please discard and replace your toothbrush after 24 hours on antibiotics to avoid reinfection.    I will wait for the results of your viral panel to have a better understanding of what is causing your symptoms. In the meantime, you can use some of the treatment options below to manage your symptoms as they come.     -Ibuprofen and Tylenol: Around the clock to manage fever and general discomfort. Follow the directions on the over-the-counter bottle for dosing  -Rest: encourage rest as much as possible! This may mean you need to stay home from work or activities to prevent prolonged illness course or spreading illness to others  -Fluids and Nutrition: It is so important to support your immune system with hydration and nutrition. Though you may not have the best appetite, it is important to encourage regular fluid intake (water, juice, electrolyte beverages) to prevent dehydration, and to eat as many nutrient rih foods as possible  -Comfort: Popsicles, cold or warm beverages, and salt water gargles are great options to soothe a sore throat  -Tessalon Perefred: I have prescribed a medication called tessalon perels to manage your cough. These can cause some drowsiness, so I would encourage you to take them at night rather than during the daytime      Watch for resolution of symptoms in the next few days. If you begin to have high fevers, begins to have difficulty swallowing or breathing, if you notice neck pain or difficulty moving neck, please return to clinic or present to the ER immediately.  Otherwise, follow up with your PCP as needed          LEE Glynn CNP [facility]    Sameer Ortiz is a 28 year old, presenting for the following health issues:  Fever, Cough (Productive cough yellow in color), Nasal Congestion, Fatigue, and Pharyngitis (The patient started with the tickle in the throat now his throat is painful.)       12/27/2023     2:28 PM   Additional Questions   Roomed by Monica AMAYA CMA   Angel is a 28 year old male who presents today with 5 days of upper respiratory symptoms. Patient notes that symptoms began on 12/23 with a sore throat. He reports that his sore throat worsened into 12/24, and he began spiking fevers on 12/25. By 12/25 symptoms had significantly worsened and included chills, nasal congestion, sore throat, productive cough, and sinus pressure. He notes that he has chest wall discomfort with excessive coughing, but denies wheezing or SOB. He has been managing symptoms at home with OTC meds such as ibuprofen and dayquil. He denies any blood in his mucus, GI upset, lethargy, or high grade fever. He notes that he is able to stay well hydrated, but has had less of an appetite. He has no know ill contacts, but was out to bars, so attests to a possibility for unknown exposure. He has not tested himself for Covid at home.     Fever  Associated symptoms include coughing, fatigue, a fever and a sore throat.   Cough  Associated symptoms include sore throat.   Fatigue  Associated symptoms include coughing, fatigue, a fever and a sore throat.   Pharyngitis   Associated symptoms include cough.   History of Present Illness       Reason for visit:  URI  Symptom onset:  3-7 days ago  Symptoms include:  Cough, sore throat, congestion chest pain  and fever  Symptom intensity:  Severe  Symptom progression:  Worsening  Had these symptoms before:  No    He eats 2-3 servings of fruits and vegetables daily.He consumes 1 sweetened beverage(s) daily.He exercises with enough effort to increase his heart rate 30 to 60 minutes per day.  He exercises with enough effort to increase his heart rate 7 days per week.   He is taking medications regularly.         Review of Systems   Constitutional:  Positive for fatigue and fever.   HENT:  Positive for sore throat.    Respiratory:  Positive for cough.       Constitutional, HEENT, cardiovascular,  pulmonary, gi and gu systems are negative, except as otherwise noted.      Objective           Vitals:  No vitals were obtained today due to virtual visit.    Physical Exam   GENERAL: Healthy, alert and no distress  EYES: Eyes grossly normal to inspection.  No discharge or erythema, or obvious scleral/conjunctival abnormalities.  RESP: No audible wheeze, cough, or visible cyanosis.  No visible retractions or increased work of breathing.    SKIN: Visible skin clear. No significant rash, abnormal pigmentation or lesions.  NEURO: Cranial nerves grossly intact.  Mentation and speech appropriate for age.  PSYCH: Mentation appears normal, affect normal/bright, judgement and insight intact, normal speech and appearance well-groomed.          Video-Visit Details    Type of service:  Video Visit   Video Start Time:  330  Video End Time:3:47 PM    Originating Location (pt. Location): Home    Distant Location (provider location):  Off-site  Platform used for Video Visit: [employer]

## 2023-12-27 NOTE — PATIENT INSTRUCTIONS
If your strep test comes back positive, will treat with a course of antibiotics. Please complete the full course of antibiotics regardless of symptom improvement.  This medication may cause some stomach upset, so you can take it with food to prevent this. You will be contagious until you have completed 24 hours of the medication, so avoid coming in close contact with others, and especially sharing beverages or food.  Please discard and replace your toothbrush after 24 hours on antibiotics to avoid reinfection.    I will wait for the results of your viral panel to have a better understanding of what is causing your symptoms. In the meantime, you can use some of the treatment options below to manage your symptoms as they come.     -Ibuprofen and Tylenol: Around the clock to manage fever and general discomfort. Follow the directions on the over-the-counter bottle for dosing  -Rest: encourage rest as much as possible! This may mean you need to stay home from work or activities to prevent prolonged illness course or spreading illness to others  -Fluids and Nutrition: It is so important to support your immune system with hydration and nutrition. Though you may not have the best appetite, it is important to encourage regular fluid intake (water, juice, electrolyte beverages) to prevent dehydration, and to eat as many nutrient rih foods as possible  -Comfort: Popsicles, cold or warm beverages, and salt water gargles are great options to soothe a sore throat  -Margoth Buckley: I have prescribed a medication called tessalon perefred to manage your cough. These can cause some drowsiness, so I would encourage you to take them at night rather than during the daytime      Watch for resolution of symptoms in the next few days. If you begin to have high fevers, begins to have difficulty swallowing or breathing, if you notice neck pain or difficulty moving neck, please return to clinic or present to the ER immediately.  Otherwise,  follow up with your PCP as needed

## 2023-12-28 LAB
FLUAV RNA SPEC QL NAA+PROBE: POSITIVE
FLUBV RNA RESP QL NAA+PROBE: NEGATIVE
GROUP A STREP BY PCR: NOT DETECTED
RSV RNA SPEC NAA+PROBE: NEGATIVE
SARS-COV-2 RNA RESP QL NAA+PROBE: NEGATIVE

## 2024-01-05 LAB — PATHOLOGY STUDY: NORMAL

## 2024-01-18 ENCOUNTER — E-VISIT (OUTPATIENT)
Dept: FAMILY MEDICINE | Facility: CLINIC | Age: 29
End: 2024-01-18
Payer: COMMERCIAL

## 2024-01-18 ENCOUNTER — MYC MEDICAL ADVICE (OUTPATIENT)
Dept: FAMILY MEDICINE | Facility: CLINIC | Age: 29
End: 2024-01-18

## 2024-01-18 ENCOUNTER — VIRTUAL VISIT (OUTPATIENT)
Dept: FAMILY MEDICINE | Facility: CLINIC | Age: 29
End: 2024-01-18
Payer: COMMERCIAL

## 2024-01-18 DIAGNOSIS — F33.0 MILD EPISODE OF RECURRENT MAJOR DEPRESSIVE DISORDER (H): Primary | ICD-10-CM

## 2024-01-18 DIAGNOSIS — R11.0 NAUSEA: ICD-10-CM

## 2024-01-18 DIAGNOSIS — F41.1 GAD (GENERALIZED ANXIETY DISORDER): ICD-10-CM

## 2024-01-18 DIAGNOSIS — L53.9 ERYTHEMATOUS CONDITION: Primary | ICD-10-CM

## 2024-01-18 DIAGNOSIS — Z21 ASYMPTOMATIC HUMAN IMMUNODEFICIENCY VIRUS (HIV) INFECTION STATUS (H): ICD-10-CM

## 2024-01-18 PROCEDURE — 99215 OFFICE O/P EST HI 40 MIN: CPT | Mod: 95 | Performed by: FAMILY MEDICINE

## 2024-01-18 PROCEDURE — 99207 PR NON-BILLABLE SERV PER CHARTING: CPT | Performed by: FAMILY MEDICINE

## 2024-01-18 NOTE — TELEPHONE ENCOUNTER
Dr Wilson completed the form.    Faxed the form to ATTN: Yasmine Knox at fax [phone].    GALDINO Larson  [facility]

## 2024-01-18 NOTE — PROGRESS NOTES
Angel is a 28 year old who is being evaluated via a billable video visit.      How would you like to obtain your AVS? MyChart  If the video visit is dropped, the invitation should be resent by: Text to cell phone: [phone]  Will anyone else be joining your video visit? No        ICD-10-CM    1. Mild episode of recurrent major depressive disorder (H24)  F33.0       2. HUGO (generalized anxiety disorder)  F41.1       3. Asymptomatic human immunodeficiency virus (HIV) infection status (H)  Z21       4. Nausea  R11.0         Patient with recent HIV diagnosis with worsening anxiety and depression seeing ID, psych and therapist.  Unable to go back to work yet, is planning to work from home.  Some nausea with new med and anxiety better working from home.  He thought pcp needed to do accomodation paperwork. I did fill it out to the best of my ability.  He would like to work from home for 6 months.  He is also advised to ask treating providers to fill out same form if possible.  Continue seeing specialists and if needed more paperwork , I would recommend asking treating providers  Follow up in October to see us for routine visit or prn for symptoms  He did ask about recommendation about his HIV med causing nausea, he did dicuss with his treating provider and was advise taking it with food.  He is on PPi as well.  May be take med at night as his symptoms comes 4-5 hours after taking it.  Ask pharmacy as well for suggestion    Spent  45 min greater than 50% of counseling and coordination of care for the conditions documented above.    Subjective   Angel is a 28 year old, presenting for the following health issues:  No chief complaint on file.        1/18/2024    12:31 PM   Additional Questions   Roomed by Kerrie     Patient has a form he needs completed.   He will be bringing the form to clinic ASAP.     History of Present Illness       Reason for visit:  [employer] accomodations      History of recent HIV diagnosis , seeing  ID    Psychiatrist , DR adenike dukes -was seen in person and seen in December  Anxiety    He has a lot of nausea due to medication for HIV possibly    [employer] accomodation  To be filled out today          Review of Systems  Constitutional, HEENT, cardiovascular, pulmonary, GI, , musculoskeletal, neuro, skin, endocrine and psych systems are negative, except as otherwise noted.      Objective           Vitals:  No vitals were obtained today due to virtual visit.    Physical Exam   GENERAL: alert and no distress  EYES: Eyes grossly normal to inspection.  No discharge or erythema, or obvious scleral/conjunctival abnormalities.  RESP: No audible wheeze, cough, or visible cyanosis.    SKIN: Visible skin clear. No significant rash, abnormal pigmentation or lesions.  NEURO: Cranial nerves grossly intact.  Mentation and speech appropriate for age.  PSYCH: Appropriate affect, tone, and pace of words      Video-Visit Details    Type of service:  Video Visit   Video Start Time: 1;30PM  Video End Time:2:14 PM    Originating Location (pt. Location): Home    Distant Location (provider location):  On-site  Platform used for Video Visit: [employer]  Signed Electronically by: Davon Wilson DO

## 2024-01-29 ENCOUNTER — MYC MEDICAL ADVICE (OUTPATIENT)
Dept: INFECTIOUS DISEASES | Facility: CLINIC | Age: 29
End: 2024-01-29
Payer: COMMERCIAL

## 2024-02-13 ENCOUNTER — MYC MEDICAL ADVICE (OUTPATIENT)
Dept: INFECTIOUS DISEASES | Facility: CLINIC | Age: 29
End: 2024-02-13
Payer: COMMERCIAL

## 2024-02-13 NOTE — TELEPHONE ENCOUNTER
Spoke to Delisa RAMIREZ CMA and she remembers giving the paperwork to Dr. Person and does not recall faxing it. Reprinted paperwork to have it filled out again and signed when Dr. Person is back in clinic.     A note was placed to return to Delisa RAMIREZ CMA when signed and she will be sure to fax and make a copy to keep in case it does not go through.     J Carlos Villatoro RN  Infectious Disease on 2/13/2024 at 2:19 PM

## 2024-02-14 NOTE — TELEPHONE ENCOUNTER
Forms faxed, copies made to hold for 30 days, originals labelled to be scanned into idiag  Patient informed via [employer]

## 2024-02-15 ENCOUNTER — MYC MEDICAL ADVICE (OUTPATIENT)
Dept: RHEUMATOLOGY | Facility: CLINIC | Age: 29
End: 2024-02-15
Payer: COMMERCIAL

## 2024-02-16 NOTE — TELEPHONE ENCOUNTER
Patient picked up forms, because they were not getting them through fax.   (Refer to [employer] messages)

## 2024-02-19 ENCOUNTER — TELEPHONE (OUTPATIENT)
Dept: INFECTIOUS DISEASES | Facility: CLINIC | Age: 29
End: 2024-02-19
Payer: COMMERCIAL

## 2024-02-19 NOTE — TELEPHONE ENCOUNTER
Forms/Letter Request    Type of form/letter: FMLA - Unknown      Do we have the form/letter: Yes: [employer]    Who is the form from? Employer (if other please explain)    Where did/will the form come from? form was faxed in    When is form/letter needed by: ASAP     How would you like the form/letter returned: Fax : [phone]    Patient Notified form requests are processed in 5-7 business days:Yes    Could we send this information to you in [employer] or would you prefer to receive a phone call?:   Patient would like to be contacted via [employer]    Provider is not in the office until March, Form will be discussed with ID Director: Dr. Baker

## 2024-02-23 ENCOUNTER — MYC REFILL (OUTPATIENT)
Dept: FAMILY MEDICINE | Facility: CLINIC | Age: 29
End: 2024-02-23
Payer: COMMERCIAL

## 2024-02-23 DIAGNOSIS — B00.9 HERPES SIMPLEX VIRUS INFECTION: ICD-10-CM

## 2024-02-26 RX ORDER — ACYCLOVIR 400 MG/1
400 TABLET ORAL EVERY 8 HOURS
Qty: 15 TABLET | Refills: 0 | Status: SHIPPED | OUTPATIENT
Start: 2024-02-26 | End: 2024-03-29

## 2024-03-04 ENCOUNTER — MYC MEDICAL ADVICE (OUTPATIENT)
Dept: FAMILY MEDICINE | Facility: CLINIC | Age: 29
End: 2024-03-04
Payer: COMMERCIAL

## 2024-03-05 ENCOUNTER — VIRTUAL VISIT (OUTPATIENT)
Dept: FAMILY MEDICINE | Facility: CLINIC | Age: 29
End: 2024-03-05
Payer: COMMERCIAL

## 2024-03-05 DIAGNOSIS — K30 CHRONIC UPSET STOMACH: ICD-10-CM

## 2024-03-05 DIAGNOSIS — Z21 ASYMPTOMATIC HUMAN IMMUNODEFICIENCY VIRUS (HIV) INFECTION STATUS (H): Primary | ICD-10-CM

## 2024-03-05 PROCEDURE — 99214 OFFICE O/P EST MOD 30 MIN: CPT | Mod: 95 | Performed by: PHYSICIAN ASSISTANT

## 2024-03-05 ASSESSMENT — PATIENT HEALTH QUESTIONNAIRE - PHQ9: SUM OF ALL RESPONSES TO PHQ QUESTIONS 1-9: 7

## 2024-03-05 NOTE — PROGRESS NOTES
Angel is a 28 year old who is being evaluated via a billable video visit.      How would you like to obtain your AVS? MyChart  If the video visit is dropped, the invitation should be resent by: Text to cell phone: [phone]  Will anyone else be joining your video visit? No          Assessment & Plan     Asymptomatic human immunodeficiency virus (HIV) infection status (H)  ADA forms filled out for patient to have accommodations starting today and ending in 1 year to allow 2 days off per month for appointments to help manage disease. Also every hour during work days to have 5-10 minutes of breaks to get up to go to the bathroom due to side effects of medication of Biktarvy. All questions and concerns were address. Patient agree's with this plan and has no further questions      Chronic upset stomach  Due to side effects of Biktarvy. ADA forms filled out for accommodations dicussed above       30 minutes spent by me on the date of the encounter doing chart review, history and exam, documentation and further activities per the note          Subjective   Angel is a 28 year old, presenting for the following health issues:  Forms    HPI     Forms  Description: Patient needs ADA forms completed. Last seen by Dr. Wilson. He was diagnosed with HIV and has been seeing infectious disease for management. The medication he is on called Biktarvy causing a lot of side effects such as gas, diarrhea, upset stomach, and abnormal bowel movements. This makes him have to use the restroom more often. He also has to  medication once a month and see his infectious disease provider every few months which causes him to miss work. He wants accommodations for these.         11/21/2023     5:59 AM 12/27/2023     2:26 PM 3/5/2024    10:19 AM   PHQ   PHQ-9 Total Score 26 4 7   Q9: Thoughts of better off dead/self-harm past 2 weeks Nearly every day Not at all Not at all   F/U: Thoughts of suicide or self-harm No     F/U: Safety concerns  No           10/12/2023     2:22 PM 10/31/2023     9:23 AM 11/21/2023     5:59 AM   HUGO-7 SCORE   Total Score   21 (severe anxiety)   Total Score 15 6 21         3/5/2024    10:19 AM   Last PHQ-9   1.  Little interest or pleasure in doing things 1   2.  Feeling down, depressed, or hopeless 0   3.  Trouble falling or staying asleep, or sleeping too much 2   4.  Feeling tired or having little energy 1   5.  Poor appetite or overeating 0   6.  Feeling bad about yourself 0   7.  Trouble concentrating 1   8.  Moving slowly or restless 2   Q9: Thoughts of better off dead/self-harm past 2 weeks 0   PHQ-9 Total Score 7   Difficulty at work, home, or with people Somewhat difficult         11/21/2023     5:59 AM   HUGO-7    1. Feeling nervous, anxious, or on edge 3   2. Not being able to stop or control worrying 3   3. Worrying too much about different things 3   4. Trouble relaxing 3   5. Being so restless that it is hard to sit still 3   6. Becoming easily annoyed or irritable 3   7. Feeling afraid, as if something awful might happen 3   HUGO-7 Total Score 21             Review of Systems  Constitutional, HEENT, cardiovascular, pulmonary, gi and gu systems are negative, except as otherwise noted.      Objective    Vitals - Patient Reported  Pain Score: No Pain (0)      Vitals:  No vitals were obtained today due to virtual visit.    Physical Exam   GENERAL: alert and no distress  EYES: Eyes grossly normal to inspection.  No discharge or erythema, or obvious scleral/conjunctival abnormalities.  RESP: No audible wheeze, cough, or visible cyanosis.    SKIN: Visible skin clear. No significant rash, abnormal pigmentation or lesions.  PSYCH: Appropriate affect, tone, and pace of words          Video-Visit Details    Type of service:  Video Visit   Video Start Time:  11:00 am  Video End Time: 11:21  am    Originating Location (pt. Location): Home    Distant Location (provider location):  On-site  Platform used for Video Visit:  Khari  Signed Electronically by: KELL Pretty

## 2024-03-06 NOTE — TELEPHONE ENCOUNTER
Faxed form to [employer].    Placed in stat scanning.    GALDINO Larson  [facility]

## 2024-03-13 ENCOUNTER — LAB (OUTPATIENT)
Dept: LAB | Facility: CLINIC | Age: 29
End: 2024-03-13
Payer: COMMERCIAL

## 2024-03-13 DIAGNOSIS — Z21 ASYMPTOMATIC HUMAN IMMUNODEFICIENCY VIRUS (HIV) INFECTION STATUS (H): ICD-10-CM

## 2024-03-13 LAB
ALBUMIN SERPL BCG-MCNC: 5 G/DL (ref 3.5–5.2)
ALP SERPL-CCNC: 61 U/L (ref 40–150)
ALT SERPL W P-5'-P-CCNC: 23 U/L (ref 0–70)
ANION GAP SERPL CALCULATED.3IONS-SCNC: 14 MMOL/L (ref 7–15)
AST SERPL W P-5'-P-CCNC: 28 U/L (ref 0–45)
BILIRUB SERPL-MCNC: 1.3 MG/DL
BUN SERPL-MCNC: 9.8 MG/DL (ref 6–20)
CALCIUM SERPL-MCNC: 9.3 MG/DL (ref 8.6–10)
CD3 CELLS # BLD: 2779 CELLS/UL (ref 603–2990)
CD3 CELLS NFR BLD: 70 % (ref 49–84)
CD3+CD4+ CELLS # BLD: 500 CELLS/UL (ref 441–2156)
CD3+CD4+ CELLS NFR BLD: 13 % (ref 28–63)
CD3+CD4+ CELLS/CD3+CD8+ CLL BLD: 0.23 % (ref 1.4–2.6)
CD3+CD8+ CELLS # BLD: 2159 CELLS/UL (ref 125–1312)
CD3+CD8+ CELLS NFR BLD: 55 % (ref 10–40)
CHLORIDE SERPL-SCNC: 107 MMOL/L (ref 98–107)
CREAT SERPL-MCNC: 0.8 MG/DL (ref 0.67–1.17)
DEPRECATED HCO3 PLAS-SCNC: 21 MMOL/L (ref 22–29)
EGFRCR SERPLBLD CKD-EPI 2021: >90 ML/MIN/1.73M2
ERYTHROCYTE [DISTWIDTH] IN BLOOD BY AUTOMATED COUNT: 13.1 % (ref 10–15)
GLUCOSE SERPL-MCNC: 90 MG/DL (ref 70–99)
HCT VFR BLD AUTO: 49.1 % (ref 40–53)
HGB BLD-MCNC: 17.5 G/DL (ref 13.3–17.7)
MCH RBC QN AUTO: 33.1 PG (ref 26.5–33)
MCHC RBC AUTO-ENTMCNC: 35.6 G/DL (ref 31.5–36.5)
MCV RBC AUTO: 93 FL (ref 78–100)
PLATELET # BLD AUTO: 199 10E3/UL (ref 150–450)
POTASSIUM SERPL-SCNC: 4 MMOL/L (ref 3.4–5.3)
PROT SERPL-MCNC: 8.5 G/DL (ref 6.4–8.3)
RBC # BLD AUTO: 5.29 10E6/UL (ref 4.4–5.9)
SODIUM SERPL-SCNC: 142 MMOL/L (ref 135–145)
T CELL COMMENT: ABNORMAL
T PALLIDUM AB SER QL: NONREACTIVE
WBC # BLD AUTO: 7.6 10E3/UL (ref 4–11)

## 2024-03-13 PROCEDURE — 99000 SPECIMEN HANDLING OFFICE-LAB: CPT | Performed by: PATHOLOGY

## 2024-03-13 PROCEDURE — 36415 COLL VENOUS BLD VENIPUNCTURE: CPT | Performed by: PATHOLOGY

## 2024-03-13 PROCEDURE — 85027 COMPLETE CBC AUTOMATED: CPT | Performed by: PATHOLOGY

## 2024-03-13 PROCEDURE — 80053 COMPREHEN METABOLIC PANEL: CPT | Performed by: PATHOLOGY

## 2024-03-13 PROCEDURE — 86359 T CELLS TOTAL COUNT: CPT | Performed by: INTERNAL MEDICINE

## 2024-03-13 PROCEDURE — 87536 HIV-1 QUANT&REVRSE TRNSCRPJ: CPT | Performed by: INTERNAL MEDICINE

## 2024-03-13 PROCEDURE — 87491 CHLMYD TRACH DNA AMP PROBE: CPT | Performed by: INTERNAL MEDICINE

## 2024-03-13 PROCEDURE — 87591 N.GONORRHOEAE DNA AMP PROB: CPT | Performed by: INTERNAL MEDICINE

## 2024-03-13 PROCEDURE — 86780 TREPONEMA PALLIDUM: CPT | Performed by: INTERNAL MEDICINE

## 2024-03-14 LAB
C TRACH DNA SPEC QL NAA+PROBE: NEGATIVE
HIV1 RNA # PLAS NAA DL=20: 130 COPIES/ML
HIV1 RNA SERPL NAA+PROBE-LOG#: 2.1 {LOG_COPIES}/ML
N GONORRHOEA DNA SPEC QL NAA+PROBE: NEGATIVE

## 2024-03-16 ENCOUNTER — TELEPHONE (OUTPATIENT)
Dept: FAMILY MEDICINE | Facility: CLINIC | Age: 29
End: 2024-03-16
Payer: COMMERCIAL

## 2024-03-16 DIAGNOSIS — F33.0 MILD EPISODE OF RECURRENT MAJOR DEPRESSIVE DISORDER (H): ICD-10-CM

## 2024-03-18 NOTE — TELEPHONE ENCOUNTER
Called patient and he is seeing a psychiatrist.  Patient said he does not need a refill on this medication because he has decided he does not need it.  Patient is seeing his psychiatrist this coming week and will discuss with him.    GALDINO Larson  [facility]

## 2024-03-18 NOTE — TELEPHONE ENCOUNTER
Is he seeing psych, last visit, he did tell me he was , not sure if its pychology who don't prescribe.  If psych seeing him, please have him reach out to them, if not I can fill it and manage but needs to be seen every 6 months   Davon Wilson D.O.\

## 2024-03-18 NOTE — TELEPHONE ENCOUNTER
Routing to RN. Please un pend order.  Patient does not need this medication at this time.    GALDINO Larson  [facility]

## 2024-03-29 ENCOUNTER — MYC REFILL (OUTPATIENT)
Dept: FAMILY MEDICINE | Facility: CLINIC | Age: 29
End: 2024-03-29
Payer: COMMERCIAL

## 2024-03-29 DIAGNOSIS — B00.9 HERPES SIMPLEX VIRUS INFECTION: ICD-10-CM

## 2024-03-29 RX ORDER — ACYCLOVIR 400 MG/1
400 TABLET ORAL EVERY 8 HOURS
Qty: 15 TABLET | Refills: 0 | Status: SHIPPED | OUTPATIENT
Start: 2024-03-29 | End: 2024-06-06

## 2024-04-03 ENCOUNTER — OFFICE VISIT (OUTPATIENT)
Dept: FAMILY MEDICINE | Facility: CLINIC | Age: 29
End: 2024-04-03
Payer: COMMERCIAL

## 2024-04-03 VITALS
RESPIRATION RATE: 18 BRPM | TEMPERATURE: 97.6 F | BODY MASS INDEX: 20.88 KG/M2 | HEART RATE: 95 BPM | WEIGHT: 133 LBS | DIASTOLIC BLOOD PRESSURE: 83 MMHG | OXYGEN SATURATION: 90 % | SYSTOLIC BLOOD PRESSURE: 121 MMHG | HEIGHT: 67 IN

## 2024-04-03 DIAGNOSIS — J02.9 SORE THROAT: ICD-10-CM

## 2024-04-03 DIAGNOSIS — J03.90 TONSILLITIS: Primary | ICD-10-CM

## 2024-04-03 LAB — DEPRECATED S PYO AG THROAT QL EIA: POSITIVE

## 2024-04-03 PROCEDURE — 99213 OFFICE O/P EST LOW 20 MIN: CPT | Performed by: FAMILY MEDICINE

## 2024-04-03 PROCEDURE — 87880 STREP A ASSAY W/OPTIC: CPT | Performed by: FAMILY MEDICINE

## 2024-04-03 PROCEDURE — 87635 SARS-COV-2 COVID-19 AMP PRB: CPT | Performed by: FAMILY MEDICINE

## 2024-04-03 RX ORDER — ACETAMINOPHEN 500 MG
500 TABLET ORAL EVERY 6 HOURS PRN
Qty: 30 TABLET | Refills: 0 | Status: SHIPPED | OUTPATIENT
Start: 2024-04-03 | End: 2024-07-16

## 2024-04-03 ASSESSMENT — PATIENT HEALTH QUESTIONNAIRE - PHQ9
10. IF YOU CHECKED OFF ANY PROBLEMS, HOW DIFFICULT HAVE THESE PROBLEMS MADE IT FOR YOU TO DO YOUR WORK, TAKE CARE OF THINGS AT HOME, OR GET ALONG WITH OTHER PEOPLE: SOMEWHAT DIFFICULT
SUM OF ALL RESPONSES TO PHQ QUESTIONS 1-9: 17
SUM OF ALL RESPONSES TO PHQ QUESTIONS 1-9: 17

## 2024-04-03 ASSESSMENT — ENCOUNTER SYMPTOMS: SORE THROAT: 1

## 2024-04-03 NOTE — PROGRESS NOTES
Assessment & Plan     Tonsillitis  Sore throat  -Strep positive  -Will treat for tonsillitis with Augmenting for 14 days.   -Discussed with patient that symptoms should improve within 1-2 days of starting abx. Discussed ER if worsening pain, swelling, breathing or swallowing difficulty, or pus drainage from tonsils.  - amoxicillin-clavulanate (AUGMENTIN) 875-125 MG tablet  Dispense: 28 tablet; Refill: 0  - Streptococcus A Rapid Screen w/Reflex to PCR - Clinic Collect  - acetaminophen (TYLENOL) 500 MG tablet  Dispense: 30 tablet; Refill: 0  - Symptomatic COVID-19 Virus (Coronavirus) by PCR Nasopharyngeal                    Subjective   Angel is a 29 year old, presenting for the following health issues:  Pharyngitis      4/3/2024    10:57 AM   Additional Questions   Roomed by Zuly     Pharyngitis     History of Present Illness       Reason for visit:  I have a sore throat  Symptom onset:  3-7 days ago  Symptoms include:  Sore thriat and chills. Sweating  Symptom intensity:  Severe  Symptom progression:  Worsening  Had these symptoms before:  Yes  Has tried/received treatment for these symptoms:  Yes  Previous treatment was successful:  Yes  Prior treatment description:  Antibiotics    He eats 2-3 servings of fruits and vegetables daily.He consumes 2 sweetened beverage(s) daily.He exercises with enough effort to increase his heart rate 9 or less minutes per day.  He exercises with enough effort to increase his heart rate 3 or less days per week.   He is taking medications regularly.     Here for sore throat that started 6 days ago. Was feeling better on and off, but today noticed more swelling and pain in left tonsil. Has fever and chills 6 days ago but that has since resolved. Pain with swallowing.  Denies SOB, difficulty swallowing, nausea, vomiting, cough.                  Objective    /83 (BP Location: Right arm, Patient Position: Sitting, Cuff Size: Adult Regular)   Pulse 95   Temp 97.6  F (36.4  C)  "(Temporal)   Resp 18   Ht 1.69 m (5' 6.54\")   Wt 60.3 kg (133 lb)   SpO2 90%   BMI 21.12 kg/m    Body mass index is 21.12 kg/m .  Physical Exam   GENERAL: alert and no distress  HENT: normal cephalic/atraumatic, ear canals and TM's normal, nose and mouth without ulcers or lesions, tonsillar hypertrophy, and tonsillar erythema. Uvula midline.  NECK: bilateral anterior cervical adenopathy, no asymmetry, masses, or scars, and thyroid normal to palpation  RESP: lungs clear to auscultation - no rales, rhonchi or wheezes  CV: regular rate and rhythm, normal S1 S2, no S3 or S4, no murmur, click or rub, no peripheral edema   NEURO: Normal strength and tone, mentation intact and speech normal  PSYCH: mentation appears normal, affect normal/bright            Signed Electronically by: Angelito Pedersen DO    "

## 2024-04-04 LAB — SARS-COV-2 RNA RESP QL NAA+PROBE: NEGATIVE

## 2024-04-11 ENCOUNTER — VIRTUAL VISIT (OUTPATIENT)
Dept: INFECTIOUS DISEASES | Facility: CLINIC | Age: 29
End: 2024-04-11
Attending: INTERNAL MEDICINE
Payer: COMMERCIAL

## 2024-04-11 VITALS — HEIGHT: 63 IN | WEIGHT: 133 LBS | BODY MASS INDEX: 23.57 KG/M2

## 2024-04-11 DIAGNOSIS — Z21 ASYMPTOMATIC HUMAN IMMUNODEFICIENCY VIRUS (HIV) INFECTION STATUS (H): Primary | ICD-10-CM

## 2024-04-11 PROCEDURE — 99214 OFFICE O/P EST MOD 30 MIN: CPT | Mod: 95 | Performed by: INTERNAL MEDICINE

## 2024-04-11 ASSESSMENT — PAIN SCALES - GENERAL: PAINLEVEL: NO PAIN (0)

## 2024-04-11 NOTE — LETTER
4/11/2024       RE: Angel Simeon  [address]  [address]  [place] [address]     Dear Colleague,    Thank you for referring your patient, Angel Simeon, to the [facility] [place] [facility]. Please see a copy of my visit note below.    Angel Simeon is here today for follow up of HIV care.    Assessment & Plan/Recommendations     ID problem list:  Recently diagnosed HIV, now on antiretroviral therapy    Recs:  1.  HIV infection  - counseled on U=U and viral load considered undetectable and how to minimize risks of transmission (barrier protection, continued excellent adherence to Biktarvy regimen)  - continue on Biktarvy 1 tab daily (gets it filled at [facility] on [address]) - already has refills at pharmacy but will provide additional today  - will plan for gonorrhea/chlamydia swabs at next visit in 2-3 months in person; plan to repeat labs prior to next visit  - follow up in approx 2-3 months with repeat labs at that time  - reports that he has completed Jynneos vaccine    2. Health maintenance  - continue to follow with primary care provider (Dr. Wilson) and psych specialists for remaining management  - consider meningococcal vaccine at next visit and last dose of Gardisil-9; discussed that he could schedule 3rd dose of HPV series at his local pharmacy as well  - reports that he has received vaccine for Monkey pox      Colonoscopy: n/a  GC/Chlamydia: due for oral/rectal swabs, urine neg 3/13/2024  Syphilis: neg 3/13/2024  Quantiferon: negative 12/13/23  G6PD:   HLA-: neg 11/2/23  Crypto (if CD4<50):   Toxo IgG: nonreactive 11/2023  CMV IgG: reactive 11/2023  Hep A antibody : reactive 11/2023  Hep B antibody: HBsAg non-reactive, HBsAb reactive, HBcAB non-reactive 11/2023  Hep C antibody: non-reactivfe 11/2023  Hep A vaccine: received 4212-5096  Hep B vaccine: received 2014-5057  Tdap vaccine: 10/31/23  Flu  "vaccine: last 2019, 11/14/23  Prevnar PCV 20 conj vaccine: 12/13/23   Pneumovax-23 PPSV poly vaccine:   Meningococcal MenACWY (Menveo, Menactra) vaccine: 2011  HPV vaccine: 2011, 2023, - due for third dose  MMR vaccine: 2003, 2011  Varicella vaccine: 2003, 2011  COVID-19 vaccine: 2019, 11/14/23    The patient was seen and discussed with attending physician Dr. Person.     Sandy Adorno MD  Internal Medicine Resident, PGY-3      Subjective       HPI:    As per initial ID consult note:  \"Angel Simeon is a 28 year old male with PMH including anxiety/depression, GERD, HSV infection, prior smoking, and recent diagnosis of HIV infection (10/2023 at primary care screening), who presents to ID clinic for HIV care.    Per patient report, doing ok since his diagnosis last month. Has not started any antiretroviral therapy yet.  Was not feeling well having abdominal discomfort (comes/goes) at time of HIV screening, did also go to hospital 10/12 (was given omeprazole which didn't change his symptoms much) who referred him to PCP for further work-up. Occasional nausea, no vomiting, occasional loose stool (non-watery, non-bloody). No fevers/chills, (in 8/2023 had 2 wks of cough, phlegm, fevers that self-limiting and resolved), +night sweats (since 8/2023) non-drenching and intermittent (~1x/week), no weight change, no current rashes, mild cervical gland swelling, no sore throat, no issues swallowing, intermittent headaches, no groin/anus discharge or lesions. In 6/2023 went to ED in [facility] after fainting, no official diagnosis at that time, just was given potassium and fluids, unsure if they screened for HIV at that time.     Thinks he has a good support system. Family lives in [facility] [place] [facility], boyfriend lives here. Remaining social history as noted below.\"    Interval events/updates:  4/1/2024 Update:  Recently developed fevers and a sore throat. Fevers resolved after 1-2 days but sore throat worsened so he went in to clinic on 4/3 and " was diagnosed with strep throat, has now completed antibiotic course and reports all symptoms have resolved, now feeling well and is at baseline. No other known issues with infections recently.     Overall he feels that he has been doing well with HIV therapy. Tolerating Biktarvy well without side effects. No missed doses - notes that he keeps track on his calendar every day (marks that he has taken in in the morning, usually around 8AM) and also uses a pill box to track his daily medications, and always double checks at night so if he did miss any medcations, he is able to take them that evening.  Did report having one episode of unprotected sex three months ago as he thought his viral load was undetectable. At that time, his partner had been taking PrEP and was tested for HIV a couple of weeks after and was reportedly negative. Patient reports that he has otherwise not been recently sexually active and no new concerns for STDs. No discharge. No LAD. No urinary symptoms. No rashes.     Reports he does have intermittent stomach pain/discomfort assoicated with bloating and is relieved by burping - has occasionally tried taking Tums for this discomfort, but usually takes the Tums at night and many hours our from his Biktarby dose. Does not take a multivitamin.   Rarely has diarrhea and will usually self resolve within a day or two.    Continues to have intermittent night sweats, 2-3 times per month randomly. No clear triggers. No other symptoms at the time. Has been an ongoing issue for him. Resolves by the end of the night, no other episodes of diaphoresis. No fevers or chills. No nausea or vomiting. No bleeding or bruising. No rashes.     HIV past medical history:  Diagnosed: 10/2023  Approximate date of transmission:   Risk factors: sexual/MSM  Kashif CD4: 382 (11/2023)  Viral load at time of diagnosis: 188,000  Genotypes/mutation history: ordered but not resulted as of 11/2023  Treatment history: Biktarvy  (2023-)  Prior OIs: none known  TB screening: never diagnosed/treated; previously screened 2019 negative for immigration status change    ART Adherence:  Current regimen: Biktarvy  Missed doses in last week, month: none  Estimates adherence at: 100%  Medication side effects: none currently (prior had some bloating)  OTC medications:     Social history:  Background: Originally from [place], moved here 2023 (for work); moved [place] when 7 [place] (born in [place])  Lives in/with: lives with boyfriend (he's aware of the diagnosis, he since has tested negative)  Supports: n/a  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 [employer] currently, then will transition to [employer]-provided insurance)  International travel: none recently - last in 2019 to [place] (3 months)  Pets: none  Alcohol: socially (once every 2 wks)  Tobacco: quit smoking tobacco (2023)  Other substances: occasional weed, never IVDU  Sexual Hx: in relationship, male  Sexual activity: yes with one stable partner (with multiple partners in [place] prior to this), current partner has been tested/negative and has been on PrEP  Adherence to condoms: intermittently  History of STI diagnosis and treatment: none prior    Prior PrEP: was on Descovy last in , but was only on it for a year and then discontinued when lost his job/insurance      PAST MEDICAL HISTORY  Otherwise as per HPI    PAST SURGICAL HISTORY  Otherwise as per HPI    ALLERGIES AND DRUG REACTIONS  No Known Allergies    FAMILY HISTORY  No known immunocompromising conditions or infections unless listed below  family history is not on file.    SOCIAL AND FUNCTIONAL HISTORY  Social History     Tobacco Use    Smoking status: Former     Current packs/day: 0.00     Types: Cigarettes     Start date: 2022     Quit date: 2023     Years since quittin.7    Smokeless tobacco: Never    Tobacco comments:     Did vaping for 2 years    Vaping Use    Vaping status: Former    Quit date:  "7/1/2023    Substances: THC, Flavoring    Devices: Disposable   Substance Use Topics    Alcohol use: Not Currently    Drug use: Never     Otherwise as per HPI    CURRENT ANTIMICROBIALS  Other medications reviewed in EPIC  Biktarvy 1 tab daily    REVIEW OF SYSTEMS  ROS obtained, pertinent positives and negatives as above.    Objective   PHYSICAL EXAMINATION  Ht 1.6 m (5' 3\")   Wt 60.3 kg (133 lb)   BMI 23.56 kg/m    Exam limited 2/2 virtual visit.     Constitutional:  appearance not in distress, appears comfortable   Eyes: no conjunctival injection, no scleral icterus  Pulmonary: unlabored breathing, able to comfortably speak conversationally  Skin: no visible rashes  Neurologic: awake, alert and interactive      Other Significant Information (Labs, cultures, radiology, etc)    Recent micro reviewed:   9/18/23 syphilis screen: negative  9/18/23 HCV screen: negative  9/18/23 gonorrhea/chlamydia urine: negative  9/18/23 HIV screen: positive  9/18/23 HBsAg non-reactive  11/2/23 HIV VL: 188k  11/2/23 CD4 count 382 (12%)  11/2/23 syphilis screen: negative  11/9/23 chlamydia screen: positive  12/13/23 HIV   12/13/23 gonorrhea/chlamdyia screening (urine/rectal/throat): negative  12/13/23 quantiferon negative  3/13/2024 HIV , CD4 count 500  3/13/2024 GC screening (urine only) negative  3/13/2024 syphilis screen negative        Attestation signed by Juan Person MD at 4/16/2024  7:21 PM (Updated):  Attestation:  I saw and evaluated this patient, discussed the patient with the resident, and agree with the assessment and plan of care as documented in the note. I personally reviewed vital signs, medications, labs, and imaging. 30 minutes spent by me on the date of the encounter doing chart review, history and exam, documentation and further activities per the note    Comments: Continue on Biktarvy, is well controlled on antiretroviral therapy. Plans to return to clinic for in person visit for routine labs " (drawn beforehand), STI screenings, and vaccines (HPV 3rd dose, meningococcal, etc).     Juan Person MD  Date of Service: 04/11/24    Virtual Visit Details  Type of service:  Video Visit   Video Start Time: 3:00pm  Video End Time:3:30pm  Originating Location (pt. Location): Home  Distant Location (provider location):  On-site  Platform used for Video Visit: Khari

## 2024-04-11 NOTE — PROGRESS NOTES
Angel Simeon is here today for follow up of HIV care.    Assessment & Plan/Recommendations     ID problem list:  Recently diagnosed HIV, now on antiretroviral therapy    Recs:  1.  HIV infection  - counseled on U=U and viral load considered undetectable and how to minimize risks of transmission (barrier protection, continued excellent adherence to Biktarvy regimen)  - continue on Biktarvy 1 tab daily (gets it filled at [facility] on [address]) - already has refills at pharmacy but will provide additional today  - will plan for gonorrhea/chlamydia swabs at next visit in 2-3 months in person; plan to repeat labs prior to next visit  - follow up in approx 2-3 months with repeat labs at that time  - reports that he has completed Jynneos vaccine    2. Health maintenance  - continue to follow with primary care provider (Dr. Wilson) and psych specialists for remaining management  - consider meningococcal vaccine at next visit and last dose of Gardisil-9; discussed that he could schedule 3rd dose of HPV series at his local pharmacy as well  - reports that he has received vaccine for Monkey pox      Colonoscopy: n/a  GC/Chlamydia: due for oral/rectal swabs, urine neg 3/13/2024  Syphilis: neg 3/13/2024  Quantiferon: negative 12/13/23  G6PD:   HLA-: neg 11/2/23  Crypto (if CD4<50):   Toxo IgG: nonreactive 11/2023  CMV IgG: reactive 11/2023  Hep A antibody : reactive 11/2023  Hep B antibody: HBsAg non-reactive, HBsAb reactive, HBcAB non-reactive 11/2023  Hep C antibody: non-reactivfe 11/2023  Hep A vaccine: received 7585-3196  Hep B vaccine: received 2042-6142  Tdap vaccine: 10/31/23  Flu vaccine: last 2019, 11/14/23  Prevnar PCV 20 conj vaccine: 12/13/23   Pneumovax-23 PPSV poly vaccine:   Meningococcal MenACWY (Menveo, Menactra) vaccine: 2011  HPV vaccine: 2011, 2023, - due for third dose  MMR vaccine: 2003, 2011  Varicella vaccine: 2003, 2011  COVID-19 vaccine: 2019, 11/14/23    The patient was seen and discussed  "with attending physician Dr. Person.     Sandy Adorno MD  Internal Medicine Resident, PGY-3      Subjective       HPI:    As per initial ID consult note:  \"Angel Simeon is a 28 year old male with PMH including anxiety/depression, GERD, HSV infection, prior smoking, and recent diagnosis of HIV infection (10/2023 at primary care screening), who presents to ID clinic for HIV care.    Per patient report, doing ok since his diagnosis last month. Has not started any antiretroviral therapy yet.  Was not feeling well having abdominal discomfort (comes/goes) at time of HIV screening, did also go to hospital 10/12 (was given omeprazole which didn't change his symptoms much) who referred him to PCP for further work-up. Occasional nausea, no vomiting, occasional loose stool (non-watery, non-bloody). No fevers/chills, (in 8/2023 had 2 wks of cough, phlegm, fevers that self-limiting and resolved), +night sweats (since 8/2023) non-drenching and intermittent (~1x/week), no weight change, no current rashes, mild cervical gland swelling, no sore throat, no issues swallowing, intermittent headaches, no groin/anus discharge or lesions. In 6/2023 went to ED in [facility] after fainting, no official diagnosis at that time, just was given potassium and fluids, unsure if they screened for HIV at that time.     Thinks he has a good support system. Family lives in [facility], boyfriend lives here. Remaining social history as noted below.\"    Interval events/updates:  4/1/2024 Update:  Recently developed fevers and a sore throat. Fevers resolved after 1-2 days but sore throat worsened so he went in to clinic on 4/3 and was diagnosed with strep throat, has now completed antibiotic course and reports all symptoms have resolved, now feeling well and is at baseline. No other known issues with infections recently.     Overall he feels that he has been doing well with HIV therapy. Tolerating Biktarvy well without side effects. No missed doses - notes " that he keeps track on his calendar every day (marks that he has taken in in the morning, usually around 8AM) and also uses a pill box to track his daily medications, and always double checks at night so if he did miss any medcations, he is able to take them that evening.  Did report having one episode of unprotected sex three months ago as he thought his viral load was undetectable. At that time, his partner had been taking PrEP and was tested for HIV a couple of weeks after and was reportedly negative. Patient reports that he has otherwise not been recently sexually active and no new concerns for STDs. No discharge. No LAD. No urinary symptoms. No rashes.     Reports he does have intermittent stomach pain/discomfort assoicated with bloating and is relieved by burping - has occasionally tried taking Tums for this discomfort, but usually takes the Tums at night and many hours our from his Biktarby dose. Does not take a multivitamin.   Rarely has diarrhea and will usually self resolve within a day or two.    Continues to have intermittent night sweats, 2-3 times per month randomly. No clear triggers. No other symptoms at the time. Has been an ongoing issue for him. Resolves by the end of the night, no other episodes of diaphoresis. No fevers or chills. No nausea or vomiting. No bleeding or bruising. No rashes.     HIV past medical history:  Diagnosed: 10/2023  Approximate date of transmission:   Risk factors: sexual/MSM  Kashif CD4: 382 (11/2023)  Viral load at time of diagnosis: 188,000  Genotypes/mutation history: ordered but not resulted as of 11/2023  Treatment history: Biktarvy (11/2023-)  Prior OIs: none known  TB screening: never diagnosed/treated; previously screened 2019 negative for immigration status change    ART Adherence:  Current regimen: Biktarvy  Missed doses in last week, month: none  Estimates adherence at: 100%  Medication side effects: none currently (prior had some bloating)  OTC medications:      Social history:  Background: Originally from [place], moved here 2023 (for work); moved [place] when 5 [place] (born in [place])  Lives in/with: lives with boyfriend (he's aware of the diagnosis, he since has tested negative)  Supports: n/a  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 [employer] via [facility] currently, 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)  International travel: none recently - last in 2019 to [place] (3 months)  Pets: none  Alcohol: socially (once every 2 wks)  Tobacco: quit smoking tobacco (2023)  Other substances: occasional weed, never IVDU  Sexual Hx: in relationship, male  Sexual activity: yes with one stable partner (with multiple partners in [place] prior to this), current partner has been tested/negative and has been on PrEP  Adherence to condoms: intermittently  History of STI diagnosis and treatment: none prior    Prior PrEP: was on Descovy last in , but was only on it for a year and then discontinued when lost his job/insurance      PAST MEDICAL HISTORY  Otherwise as per HPI    PAST SURGICAL HISTORY  Otherwise as per HPI    ALLERGIES AND DRUG REACTIONS  No Known Allergies    FAMILY HISTORY  No known immunocompromising conditions or infections unless listed below  family history is not on file.    SOCIAL AND FUNCTIONAL HISTORY  Social History     Tobacco Use    Smoking status: Former     Current packs/day: 0.00     Types: Cigarettes     Start date: 2022     Quit date: 2023     Years since quittin.7    Smokeless tobacco: Never    Tobacco comments:     Did vaping for 2 years    Vaping Use    Vaping status: Former    Quit date: 2023    Substances: THC, Flavoring    Devices: Disposable   Substance Use Topics    Alcohol use: Not Currently    Drug use: Never     Otherwise as per HPI    CURRENT ANTIMICROBIALS  Other medications reviewed in EPIC  Biktarvy 1 tab daily    REVIEW OF SYSTEMS  ROS obtained, pertinent positives and negatives as above.    Objective  "  PHYSICAL EXAMINATION  Ht 1.6 m (5' 3\")   Wt 60.3 kg (133 lb)   BMI 23.56 kg/m    Exam limited 2/2 virtual visit.     Constitutional:  appearance not in distress, appears comfortable   Eyes: no conjunctival injection, no scleral icterus  Pulmonary: unlabored breathing, able to comfortably speak conversationally  Skin: no visible rashes  Neurologic: awake, alert and interactive      Other Significant Information (Labs, cultures, radiology, etc)    Recent micro reviewed:   9/18/23 syphilis screen: negative  9/18/23 HCV screen: negative  9/18/23 gonorrhea/chlamydia urine: negative  9/18/23 HIV screen: positive  9/18/23 HBsAg non-reactive  11/2/23 HIV VL: 188k  11/2/23 CD4 count 382 (12%)  11/2/23 syphilis screen: negative  11/9/23 chlamydia screen: positive  12/13/23 HIV   12/13/23 gonorrhea/chlamdyia screening (urine/rectal/throat): negative  12/13/23 quantiferon negative  3/13/2024 HIV , CD4 count 500  3/13/2024 GC screening (urine only) negative  3/13/2024 syphilis screen negative      "

## 2024-04-11 NOTE — NURSING NOTE
No other vitals to report today    Is the patient currently in the state of [place]? YES    Visit mode:VIDEO    If the visit is dropped, the patient can be reconnected by: VIDEO VISIT: Text to cell phone:   Telephone Information:   Mobile [phone]    and VIDEO VISIT: Send to e-mail at: [email or web address]    Will anyone else be joining the visit? NO  (If patient encounters technical issues they should call [phone])    How would you like to obtain your AVS? MyChart    Are changes needed to the allergy or medication list? No    Patient declined individual allergy and medication review by support staff because they deny any changes and state that all information remains accurate since last reviewed/verified.     No changes since reviewed last week per pt     Are refills needed on medications prescribed by this physician? NO    Reason for visit: CARRINGTON Peoples MA VVF

## 2024-04-11 NOTE — PROGRESS NOTES
"Virtual Visit Details    Type of service:  Video Visit   Video Start Time: {video visit start/end time for provider to select:803413}  Video End Time:{video visit start/end time for provider to select:574515}    Originating Location (pt. Location): {video visit patient location:131380::\"Home\"}  {PROVIDER LOCATION On-site should be selected for visits conducted from your clinic location or adjoining [facility] hospital, academic office, or other nearby [facility] building. Off-site should be selected for all other provider locations, including home:436838}  Distant Location (provider location):  {virtual location provider:185447}  Platform used for Video Visit: {Virtual Visit Platforms:304242::\"[place]\"}  "

## 2024-05-13 ENCOUNTER — TELEPHONE (OUTPATIENT)
Dept: INFECTIOUS DISEASES | Facility: CLINIC | Age: 29
End: 2024-05-13
Payer: COMMERCIAL

## 2024-05-13 DIAGNOSIS — Z21 ASYMPTOMATIC HUMAN IMMUNODEFICIENCY VIRUS (HIV) INFECTION STATUS (H): Primary | ICD-10-CM

## 2024-05-17 ENCOUNTER — MYC MEDICAL ADVICE (OUTPATIENT)
Dept: FAMILY MEDICINE | Facility: CLINIC | Age: 29
End: 2024-05-17
Payer: COMMERCIAL

## 2024-05-17 ENCOUNTER — TELEPHONE (OUTPATIENT)
Dept: INFECTIOUS DISEASES | Facility: CLINIC | Age: 29
End: 2024-05-17
Payer: COMMERCIAL

## 2024-05-17 DIAGNOSIS — F33.0 MILD EPISODE OF RECURRENT MAJOR DEPRESSIVE DISORDER (H): Primary | ICD-10-CM

## 2024-05-17 DIAGNOSIS — F41.1 GAD (GENERALIZED ANXIETY DISORDER): ICD-10-CM

## 2024-05-17 NOTE — TELEPHONE ENCOUNTER
RN replied to patient via [employer]. See message for details.     Abdifatah Lynch RN, BSN, PHN  [facility]: [place]

## 2024-05-17 NOTE — TELEPHONE ENCOUNTER
Routing My Chart message to Dr. Wilson    Patient requesting Psychiatry referral for management of Prozac.    Pended if appropriate.    Abdifatah Lynch, RN, BSN, PHN  [facility]

## 2024-06-06 ENCOUNTER — VIRTUAL VISIT (OUTPATIENT)
Dept: PHARMACY | Facility: CLINIC | Age: 29
End: 2024-06-06
Payer: COMMERCIAL

## 2024-06-06 ENCOUNTER — MYC REFILL (OUTPATIENT)
Dept: FAMILY MEDICINE | Facility: CLINIC | Age: 29
End: 2024-06-06
Payer: COMMERCIAL

## 2024-06-06 DIAGNOSIS — F41.1 GAD (GENERALIZED ANXIETY DISORDER): ICD-10-CM

## 2024-06-06 DIAGNOSIS — B00.9 HERPES SIMPLEX VIRUS INFECTION: ICD-10-CM

## 2024-06-06 DIAGNOSIS — Z21 ASYMPTOMATIC HIV INFECTION, WITH NO HISTORY OF HIV-RELATED ILLNESS (H): Primary | ICD-10-CM

## 2024-06-06 PROCEDURE — 99605 MTMS BY PHARM NP 15 MIN: CPT | Mod: 95 | Performed by: PHARMACIST

## 2024-06-06 NOTE — Clinical Note
Jonh Wilson- patient self-referred to meet with me. He mostly wanted to talk about his anxiety. He had a lot of racing, unorganized thoughts. He saw a psychiatrist last week and said they decided he'd continue therapy. Today, he said he'd like to start a medication due to uncontrolled symptoms. I recommended calling psychiatrist to discuss more but to reach out he doesn't have luck with that. Just sending as a heads up in case he reaches out. Thank you.  Gilma Nj, PharmD, [facility] Medication Therapy Management Pharmacist

## 2024-06-06 NOTE — Clinical Note
Sorry just want to clarify that by saying disorganized, it was hard to follow what he was saying during the visit because was jumping around a lot.

## 2024-06-06 NOTE — PROGRESS NOTES
.Medication Therapy Management (MTM) Encounter    ASSESSMENT:                            Medication Adherence/Access: No issues identified    Anxiety  Patient experiencing anxiety and racing thoughts. Since he just met with a new psychiatrist, recommend reaching out to them to discuss starting a medication to help with anxiety. If there is a long wait time, recommend to contact Dr. Wilson about restarting fluoxetine until next psych appointment. Would benefit from being in consistent care with therapist/psychiatrist if able.     HIV:   HIV RNA not at goal <20 copies. No reported issues with Biktarvy adherence today and has appointment in 3 weeks to recheck viral load. Can get HPV and hepB vaccines at next appointment. Would need to give prescription for shingrix to get at the pharmacy.     PLAN:                            Contact psychiatrist about starting a medication to help with his anxiety/racing thoughts  Reach out to [facility] about available LGBTQ+ counselors/therapists.   Will message our  to help with navigating psychiatric care.  Continue taking Biktarvy every day - we'll see you in clinic in 3 weeks!    Follow-up: 3 weeks    SUBJECTIVE/OBJECTIVE:                          Angel Simeon is a 29 year old male contacted via secure video for an initial visit. He was referred to me from - self referred.      Reason for visit: Anxiety.    Allergies/ADRs: Reviewed in chart  Past Medical History: Reviewed in chart  Tobacco: He reports that he quit smoking about 11 months ago. His smoking use included cigarettes. He started smoking about 23 months ago. He has never used smokeless tobacco.  Socially (once every 2 wks)  Tobacco: quit smoking tobacco (2/2023)  Other substances: occasional weed, never IVDU   He's a member of the [employer] project  Medication Adherence/Access: would like to re-start medication for anxiety    Anxiety  No current medications    Patient reports he has been feeling forgetful  "and has lots of thoughts at once. He feels like \"his head goes in an out at once\". This makes it difficult to focus on work and drive. He tried taking fluoxetine 20 mg once daily for awhile and stopped because he didn't think it was helping. Now that he's off of it, he feels like it was helping him to be more present.    Reports following with a therapist but doesn't think it's helping enough.  Saw a new psychiatrist last week on 5/31: Kush Alfaro, APRN, CNP, PMHNP-BC at [facility] in [place]. Reports he was told to continue therapy at that time and to follow-up in 1 month if it wasn't helping.     He's interested in a LGBTQ+ therapist and psychiatrist if able.    Other previous trials per chart review:   Buspirone   Escitalopram   Hydroxyzine       HIV:   Biktarvy once daily   No side effects  T-cells: 500 on 3/13/24  HIV RNA: 130 copies 3/13/24  Phenotype 12/16/23  NRTI: none  NNRTI: E138A, V245E  PI: E35D, D60E   No resistance predicted  Vaccines: immune to hepA, not immune to hepB. Due for hepB, HPV#3, shingrix    Today's Vitals: There were no vitals taken for this visit.  ----------------    I spent 15 minutes with this patient today. All changes were made via collaborative practice agreement with Dr. Person. A copy of the visit note was provided to the patient's provider(s).    A summary of these recommendations was sent via [employer].    Telemedicine Visit Details  Type of service:  Video Conference via [employer]  Start Time:  10:30 am  End Time:  10:45 am     Medication Therapy Recommendations  HUGO (generalized anxiety disorder)    Rationale: Untreated condition - Needs additional medication therapy - Indication   Recommendation: Make Appt with PCP   Status: Patient Agreed - Adherence/Education          '  "

## 2024-06-06 NOTE — Clinical Note
Jonh Antoine- would you be able to reach out to Angel to check-in on accessing mental health services? We talked about a few steps today so maybe next week would be helpful to check-in to see if any updates? He seemed pretty unstable during our visit today (racing, disorganized thoughts). I'll plan to check-in with him again at next infectious disease appointment on 6/26.  Thanks! Gilma

## 2024-06-06 NOTE — PATIENT INSTRUCTIONS
"Recommendations from today's MTM visit:                                                      Contact psychiatrist about starting a medication to help with anxiety/racing thoughts  [facility]+ counselors/therapists.   Will ask our , Elina, to help with navigating psychiatric care.  Continue taking Biktarvy every day - we'll see you in clinic in 3 weeks!    Follow-up: 3 weeks    It was great speaking with you today.  I value your experience and would be very thankful for your time in providing feedback in our clinic survey. In the next few days, you may receive an email or text message from [employer] with a link to a survey related to your  clinical pharmacist.\"     To schedule another MTM appointment, please call the clinic directly or you may call the MTM scheduling line at [phone] or toll-free at [phone].     My Clinical Pharmacist's contact information:                                                      Please feel free to contact me with any questions or concerns you have.      Gilma Nj, PharmD, AAHIVP  Medication Therapy Management Pharmacist      "

## 2024-06-07 RX ORDER — ACYCLOVIR 400 MG/1
400 TABLET ORAL EVERY 8 HOURS
Qty: 15 TABLET | Refills: 0 | Status: SHIPPED | OUTPATIENT
Start: 2024-06-07 | End: 2024-09-17

## 2024-06-10 ENCOUNTER — PATIENT OUTREACH (OUTPATIENT)
Dept: INFECTIOUS DISEASES | Facility: CLINIC | Age: 29
End: 2024-06-10
Payer: COMMERCIAL

## 2024-06-10 NOTE — TELEPHONE ENCOUNTER
Social Work - Telephone  [facility]  Data: 6/10/2024  Patient Name: Angel Simeon  Goes By: Angel    /Age: 1995 (29 year old)      Referral Source: JULIÁN Wilks Pharmacist  Reason for Referral: Mental Health    Intervention: Referral received for general check in and follow up on mental health care. Voicemail left.   Plan:  will await patient's return phone call/message and provide assistance at that time. Patient has a scheduled appointment for  with Dr. Person.     JOE Shukla, [facility]  Infectious Disease

## 2024-06-17 ENCOUNTER — PATIENT OUTREACH (OUTPATIENT)
Dept: INFECTIOUS DISEASES | Facility: CLINIC | Age: 29
End: 2024-06-17
Payer: COMMERCIAL

## 2024-06-17 NOTE — TELEPHONE ENCOUNTER
Social Work - Follow-Up  [facility]    Data/Intervention: 2024    Patient Name: Angel Simeon Goes By: Angel    /Age: 1995 (29 year old)    Reason for Follow-Up:  See previous note     Intervention/Education/Resources Provided:  Writer reached out again to Patient and was able to speak with them.     Patient reports they are doing much better than earlier this month. Patient was able to contact their psychiatrist as well as receive medication. Patient notes that they are interested in a substance use assessment. Writer and Patient reviewed options for this. Patient denied needing mental health resources or further assistance, was appreciative of the call.     Assessment/Plan:    Patient plans to follow up with a substance use assessment and continue working with their psychiatrist. Writer encouraged Patient to reach out if further assistance was needed.     JOE Shukla, [facility]  Infectious Disease

## 2024-06-26 ENCOUNTER — PATIENT OUTREACH (OUTPATIENT)
Dept: INFECTIOUS DISEASES | Facility: CLINIC | Age: 29
End: 2024-06-26

## 2024-06-26 ENCOUNTER — OFFICE VISIT (OUTPATIENT)
Dept: INFECTIOUS DISEASES | Facility: CLINIC | Age: 29
End: 2024-06-26
Attending: INTERNAL MEDICINE
Payer: COMMERCIAL

## 2024-06-26 ENCOUNTER — LAB (OUTPATIENT)
Dept: LAB | Facility: CLINIC | Age: 29
End: 2024-06-26
Payer: COMMERCIAL

## 2024-06-26 VITALS
HEART RATE: 63 BPM | WEIGHT: 138 LBS | SYSTOLIC BLOOD PRESSURE: 112 MMHG | BODY MASS INDEX: 24.45 KG/M2 | OXYGEN SATURATION: 98 % | DIASTOLIC BLOOD PRESSURE: 67 MMHG | TEMPERATURE: 98.3 F

## 2024-06-26 DIAGNOSIS — Z21 ASYMPTOMATIC HUMAN IMMUNODEFICIENCY VIRUS (HIV) INFECTION STATUS (H): ICD-10-CM

## 2024-06-26 DIAGNOSIS — Z21 ASYMPTOMATIC HUMAN IMMUNODEFICIENCY VIRUS (HIV) INFECTION STATUS (H): Primary | ICD-10-CM

## 2024-06-26 LAB
ALBUMIN SERPL BCG-MCNC: 4.9 G/DL (ref 3.5–5.2)
ALP SERPL-CCNC: 55 U/L (ref 40–150)
ALT SERPL W P-5'-P-CCNC: 12 U/L (ref 0–70)
ANION GAP SERPL CALCULATED.3IONS-SCNC: 10 MMOL/L (ref 7–15)
AST SERPL W P-5'-P-CCNC: 17 U/L (ref 0–45)
BILIRUB SERPL-MCNC: 1.9 MG/DL
BUN SERPL-MCNC: 8.8 MG/DL (ref 6–20)
CALCIUM SERPL-MCNC: 9.7 MG/DL (ref 8.6–10)
CD3 CELLS # BLD: 2288 CELLS/UL (ref 603–2990)
CD3 CELLS NFR BLD: 71 % (ref 49–84)
CD3+CD4+ CELLS # BLD: 589 CELLS/UL (ref 441–2156)
CD3+CD4+ CELLS NFR BLD: 18 % (ref 28–63)
CD3+CD4+ CELLS/CD3+CD8+ CLL BLD: 0.36 % (ref 1.4–2.6)
CD3+CD8+ CELLS # BLD: 1624 CELLS/UL (ref 125–1312)
CD3+CD8+ CELLS NFR BLD: 50 % (ref 10–40)
CHLORIDE SERPL-SCNC: 105 MMOL/L (ref 98–107)
CREAT SERPL-MCNC: 0.88 MG/DL (ref 0.67–1.17)
DEPRECATED HCO3 PLAS-SCNC: 25 MMOL/L (ref 22–29)
EGFRCR SERPLBLD CKD-EPI 2021: >90 ML/MIN/1.73M2
ERYTHROCYTE [DISTWIDTH] IN BLOOD BY AUTOMATED COUNT: 15.7 % (ref 10–15)
GLUCOSE SERPL-MCNC: 100 MG/DL (ref 70–99)
HCT VFR BLD AUTO: 38.7 % (ref 40–53)
HGB BLD-MCNC: 13.4 G/DL (ref 13.3–17.7)
MCH RBC QN AUTO: 33.9 PG (ref 26.5–33)
MCHC RBC AUTO-ENTMCNC: 34.6 G/DL (ref 31.5–36.5)
MCV RBC AUTO: 98 FL (ref 78–100)
PLATELET # BLD AUTO: 184 10E3/UL (ref 150–450)
POTASSIUM SERPL-SCNC: 4.4 MMOL/L (ref 3.4–5.3)
PROT SERPL-MCNC: 7.8 G/DL (ref 6.4–8.3)
RBC # BLD AUTO: 3.95 10E6/UL (ref 4.4–5.9)
SODIUM SERPL-SCNC: 140 MMOL/L (ref 135–145)
T CELL COMMENT: ABNORMAL
T PALLIDUM AB SER QL: NONREACTIVE
WBC # BLD AUTO: 8.2 10E3/UL (ref 4–11)

## 2024-06-26 PROCEDURE — 87536 HIV-1 QUANT&REVRSE TRNSCRPJ: CPT | Performed by: INTERNAL MEDICINE

## 2024-06-26 PROCEDURE — 87591 N.GONORRHOEAE DNA AMP PROB: CPT | Performed by: INTERNAL MEDICINE

## 2024-06-26 PROCEDURE — 99000 SPECIMEN HANDLING OFFICE-LAB: CPT | Performed by: PATHOLOGY

## 2024-06-26 PROCEDURE — 86359 T CELLS TOTAL COUNT: CPT | Performed by: INTERNAL MEDICINE

## 2024-06-26 PROCEDURE — 87491 CHLMYD TRACH DNA AMP PROBE: CPT | Performed by: INTERNAL MEDICINE

## 2024-06-26 PROCEDURE — 86780 TREPONEMA PALLIDUM: CPT | Performed by: INTERNAL MEDICINE

## 2024-06-26 PROCEDURE — 36415 COLL VENOUS BLD VENIPUNCTURE: CPT | Performed by: PATHOLOGY

## 2024-06-26 PROCEDURE — 85027 COMPLETE CBC AUTOMATED: CPT | Performed by: PATHOLOGY

## 2024-06-26 PROCEDURE — 80053 COMPREHEN METABOLIC PANEL: CPT | Performed by: PATHOLOGY

## 2024-06-26 ASSESSMENT — PAIN SCALES - GENERAL: PAINLEVEL: NO PAIN (0)

## 2024-06-26 NOTE — LETTER
6/26/2024       RE: Angel Simeon  [address]  [address]  [facility] [facility] [address]     Dear Colleague,    Thank you for referring your patient, Angel Simeon, to the [facility] INFECTIOUS DISEASE CLINIC [facility]. Please see a copy of my visit note below.    Patient left the clinic prior to being roomed due to support staff delays. No charge.      Again, thank you for allowing me to participate in the care of your patient.      Sincerely,    Juan Person MD

## 2024-06-26 NOTE — TELEPHONE ENCOUNTER
Social Work - Telephone  [facility]  Data: 2024  Patient Name: Angel Simeon  Goes By: Angel    /Age: 1995 (29 year old)      Referral Source: RN Team   Reason for Referral: MyChart Message     Intervention:   Patient had a scheduled appointment today, . Patient left after waiting a significant amount of time for his appointment (unclear why he was not roomed). Patient send a follow up [employer] message regarding this issue. Writer was asked by RN team to reach out to Patient.   Writer called Patient and apologized for today's issue. Patient stated that he lives in [place] and wondered if he could go to a closer clinic. Writer reviewed his options briefly. Patient then indicated he would stay with the clinic, acknowledged the July appointment, then stated he had to go due to work.   Plan: Writer will forward so RN Team is aware of his response.

## 2024-06-26 NOTE — NURSING NOTE
"Chief Complaint   Patient presents with    RECHECK     B20     Vital signs:  Temp: 98.3  F (36.8  C)   BP: 112/67 Pulse: 63     SpO2: 98 %       Weight: 62.6 kg (138 lb)  Estimated body mass index is 24.45 kg/m  as calculated from the following:    Height as of 4/11/24: 1.6 m (5' 3\").    Weight as of this encounter: 62.6 kg (138 lb).      Shannon Orellana CMA   6/26/2024 12:48 PM    "

## 2024-06-27 LAB
C TRACH DNA SPEC QL NAA+PROBE: NEGATIVE
HIV1 RNA # PLAS NAA DL=20: 94 COPIES/ML
HIV1 RNA SERPL NAA+PROBE-LOG#: 2 {LOG_COPIES}/ML
N GONORRHOEA DNA SPEC QL NAA+PROBE: NEGATIVE

## 2024-07-05 ENCOUNTER — MYC REFILL (OUTPATIENT)
Dept: INFECTIOUS DISEASES | Facility: CLINIC | Age: 29
End: 2024-07-05
Payer: COMMERCIAL

## 2024-07-05 DIAGNOSIS — Z21 ASYMPTOMATIC HUMAN IMMUNODEFICIENCY VIRUS (HIV) INFECTION STATUS (H): ICD-10-CM

## 2024-07-05 NOTE — TELEPHONE ENCOUNTER
"Medication Refill                                                     Refill request receive for:  bictegravir-emtricitabine-tenofovir (BIKTARVY) -25 MG per tablet     Last refill: 05/13/24    Last Office Visit 6/26/2024  Future appt scheduled? Yes: 7/24     POC for medication if indicated from last note including duration of treatment if applicable: - continue on Biktarvy 1 tab daily       RECENT LABS/VITALS                                                        Lab Results   Component Value Date    AST 17 06/26/2024     Lab Results   Component Value Date    ALT 12 06/26/2024     Creatinine   Date Value Ref Range Status   06/26/2024 0.88 0.67 - 1.17 mg/dL Final   ]  Alkaline Phosphatase   Date/Time Value Ref Range Status   06/26/2024 11:53 AM 55 40 - 150 U/L Final     No results found for: \"LABAPCBCDIFF\"                    "

## 2024-07-16 ENCOUNTER — VIRTUAL VISIT (OUTPATIENT)
Dept: PHARMACY | Facility: CLINIC | Age: 29
End: 2024-07-16
Payer: COMMERCIAL

## 2024-07-16 DIAGNOSIS — K21.9 GASTROESOPHAGEAL REFLUX DISEASE, UNSPECIFIED WHETHER ESOPHAGITIS PRESENT: ICD-10-CM

## 2024-07-16 DIAGNOSIS — Z78.9 TAKES DIETARY SUPPLEMENTS: ICD-10-CM

## 2024-07-16 DIAGNOSIS — F41.1 GAD (GENERALIZED ANXIETY DISORDER): ICD-10-CM

## 2024-07-16 DIAGNOSIS — Z21 ASYMPTOMATIC HIV INFECTION, WITH NO HISTORY OF HIV-RELATED ILLNESS (H): Primary | ICD-10-CM

## 2024-07-16 PROCEDURE — 99606 MTMS BY PHARM EST 15 MIN: CPT | Mod: 95 | Performed by: PHARMACIST

## 2024-07-16 RX ORDER — AMLODIPINE BESYLATE 100 %
1 POWDER (GRAM) MISCELLANEOUS
COMMUNITY

## 2024-07-16 RX ORDER — CALCIUM CARBONATE 500 MG/1
1 TABLET, CHEWABLE ORAL DAILY PRN
COMMUNITY

## 2024-07-16 NOTE — PATIENT INSTRUCTIONS
"Recommendations from today's MTM visit:                                                      If you forget to take Biktarvy in the morning, you can still take it later in the day. Don't take two Biktarvy tablets at the same time.   Take calcium carbonate (Tums) and magnesium at least 6 hours before or 2 hours after taking Biktarvy. Taking Biktarvy with food also minimizes the interaction.    Follow-up: 3 weeks    It was great speaking with you today.  I value your experience and would be very thankful for your time in providing feedback in our clinic survey. In the next few days, you may receive an email or text message from [facility] [email or web address] with a link to a survey related to your  clinical pharmacist.\"     To schedule another MTM appointment, please call the clinic directly or you may call the MTM scheduling line at [phone] or toll-free at [phone].     My Clinical Pharmacist's contact information:                                                      Please feel free to contact me with any questions or concerns you have.      Gilma Nj, PharmD, AAHIVP  Medication Therapy Management Pharmacist      "

## 2024-07-16 NOTE — PROGRESS NOTES
.Medication Therapy Management (MTM) Encounter    ASSESSMENT:                            Medication Adherence/Access: continue to use pill box.    HIV:   Provided more information on how to manage missed doses. Last HIV RNA was not at goal <20. Missed doses could be contributing. Continue to monitor.     Anxiety  Improved     GERD:   Okay to use Tums as needed instead of omeprazole. Continue to separate Tums from Biktarvy.    Supplements:   Continue to separate from Biktarvy     PLAN:                            If you forget to take Biktarvy in the morning, you can still take it later in the day. Don't take two Biktarvy tablets at the same time.   Take calcium carbonate (Tums) and magnesium at least 6 hours before or 2 hours after taking Biktarvy. Taking Biktarvy with food also minimizes the interaction.    Follow-up: 3 weeks    SUBJECTIVE/OBJECTIVE:                          Angel Simeon is a 29 year old male contacted via secure video for a follow-up visit.     Reason for visit: antiretroviral therapy adherence check-in    Allergies/ADRs: Reviewed in chart  Past Medical History: Reviewed in chart  Tobacco: He reports that he quit smoking about 12 months ago. His smoking use included cigarettes. He started smoking about 2 years ago. He has never used smokeless tobacco.  Socially (once every 2 wks)  Tobacco: quit smoking tobacco (2/2023)  Other substances: occasional weed, never IVDU   He's a member of the [employer]    Medication Adherence/Access:   Takes antiretroviral therapy and fluoxetine first thing when he wakes up between 8-10 am. Takes supplements at night sometimes. Uses a pill box for Biktarvy and counts his pills. Reports not missing Biktarvy doses very often.     HIV:   Biktarvy once daily.   No side effects. Wonders what to do if he misses a dose. He forgot to take Biktarvy yesterday morning.   T-cells: 589 on 6/26/24  HIV RNA: 94 on 6/26/24  Phenotype 12/16/23  NRTI: none  NNRTI: E138A,  V245E  PI: E35D, D60E   No resistance predicted  Vaccines: immune to hepA. Completed hepB vaccine series. Due for HPV#3, shingrix    Anxiety  Fluoxetine 20 mg once daily     He's feeling better on fluoxetine. It's helping him to stay more present. Sees psychiatrist every other Wednesday and is working with a therapis.     Other previous trials per chart review:   Buspirone   Escitalopram   Hydroxyzine     GERD:   Omeprazole 20 mg once daily as needed   Calcium carbonate (Tums) as needed     The medications are helpful for stomach upset. He is aware to separate calcium from Biktarvy and usually just takes Tums at night.    Supplements:   Magnesium stearate once daily at bedtime - takes occasionally for sleep  No issues      Today's Vitals: There were no vitals taken for this visit.  ----------------    I spent 15 minutes with this patient today. All changes were made via collaborative practice agreement with Dr. Person. A copy of the visit note was provided to the patient's provider(s).    A summary of these recommendations was sent via Moat.    Telemedicine Visit Details  Type of service:  Video Conference via echoBase  Start Time:  10:30 am  End Time:  10:45 am     Medication Therapy Recommendations  No medication therapy recommendations to display   '

## 2024-07-25 ENCOUNTER — VIRTUAL VISIT (OUTPATIENT)
Dept: INFECTIOUS DISEASES | Facility: CLINIC | Age: 29
End: 2024-07-25
Attending: INTERNAL MEDICINE
Payer: COMMERCIAL

## 2024-07-25 VITALS — BODY MASS INDEX: 22.18 KG/M2 | HEIGHT: 66 IN | WEIGHT: 138 LBS

## 2024-07-25 DIAGNOSIS — Z21 ASYMPTOMATIC HUMAN IMMUNODEFICIENCY VIRUS (HIV) INFECTION STATUS (H): Primary | ICD-10-CM

## 2024-07-25 DIAGNOSIS — R19.7 DIARRHEA, UNSPECIFIED TYPE: ICD-10-CM

## 2024-07-25 DIAGNOSIS — R19.8 GI SYMPTOMS: ICD-10-CM

## 2024-07-25 PROCEDURE — 99214 OFFICE O/P EST MOD 30 MIN: CPT | Mod: 95 | Performed by: INTERNAL MEDICINE

## 2024-07-25 ASSESSMENT — PAIN SCALES - GENERAL: PAINLEVEL: NO PAIN (0)

## 2024-07-25 NOTE — NURSING NOTE
Current patient location: [address]    [facility] [address]    Is the patient currently in the state of [place]? YES    Visit mode:VIDEO    If the visit is dropped, the patient can be reconnected by: VIDEO VISIT: Text to cell phone:   Telephone Information:   Mobile [phone]       Will anyone else be joining the visit? NO  (If patient encounters technical issues they should call [phone])    How would you like to obtain your AVS? MyChart    Are changes needed to the allergy or medication list? No    Are refills needed on medications prescribed by this physician? NO    Reason for visit: [place]

## 2024-07-25 NOTE — PROGRESS NOTES
"Virtual Visit Details    Type of service:  Video Visit   Video Start Time: {video visit start/end time for provider to select:658743}  Video End Time:{video visit start/end time for provider to select:855113}    Originating Location (pt. Location): {video visit patient location:419962::\"Home\"}  {PROVIDER LOCATION On-site should be selected for visits conducted from your clinic location or adjoining [facility] hospital, academic office, or other nearby [facility] building. Off-site should be selected for all other provider locations, including home:834794}  Distant Location (provider location):  {virtual location provider:359605}  Platform used for Video Visit: {Virtual Visit Platforms:559748::\"ViaCLIX\"}  "

## 2024-07-25 NOTE — LETTER
7/25/2024       RE: Angel Simeon  [address]  [address]  [facility] [address]     Dear Colleague,    Thank you for referring your patient, Angel Simeon, to the [facility] [place] [facility]. Please see a copy of my visit note below.    Virtual Visit Details     Type of service:  Video Visit   Video Start Time: 3:27pm  Video End Time:3:41pm    Originating Location (pt. Location): Home  Distant Location (provider location):  On-site  Platform used for Video Visit: AmishNeri       Angel Simeon is here today for follow up of HIV care.    Assessment & Plan/Recommendations     ID problem list:  HIV, well controlled on antiretroviral therapy    Recs:  1.  HIV infection  - continue on Biktarvy 1 tab daily (gets it filled at [facility]) - said he would prefer 90-day supplies if possible (currently they are dispensing monthly supply)  - he has discussed administration and interactions with [facility] pharmacist last week  - sent repeat monitoring labs prior to next visit (discussed that we will plan to cut back on checking CD4 counts though as his has remained stable)  - follow up in approx 3 months with repeat labs at that time    2. Nausea, epigastric abdominal discomfort, diarrhea, elevated bilirubin  - check H.pylori stool, GI panel, stool O&P, stool microsporidia/cryptosporidium stains  - consider abdominal imaging and GI referral if not improving and above studies non-diagnostic  - discussed patient to call us if acute change or worsening pain    3. Health maintenance  - continue to follow with primary care provider (Dr. Wilson) and psych specialists for remaining management  - consider meningococcal vaccine at next visit and last dose of Gardisil-9; discussed that he could schedule 3rd dose of HPV series at his local pharmacy as well  - can consider HBV booster and then antibody recheck  - reports that he has received vaccine for Monkey  "pox      Colonoscopy: n/a  GC/Chlamydia: due for oral/rectal swabs, urine neg 3/13/2024  Syphilis: neg 3/13/2024  Quantiferon: negative 12/13/23  G6PD:   HLA-: neg 11/2/23  Crypto (if CD4<50):   Toxo IgG: nonreactive 11/2023  CMV IgG: reactive 11/2023  Hep A antibody : reactive 11/2023  Hep B antibody: HBsAg non-reactive, HBsAb reactive, HBcAB non-reactive 11/2023  Hep C antibody: non-reactivfe 11/2023  Hep A vaccine: received 9333-2193  Hep B vaccine: received 3248-4870  Tdap vaccine: 10/31/23  Flu vaccine: last 2019, 11/14/23  Prevnar PCV 20 conj vaccine: 12/13/23   Pneumovax-23 PPSV poly vaccine:   Meningococcal MenACWY (Menveo, Menactra) vaccine: 2011  HPV vaccine: 2011, 2023, - due for third dose  MMR vaccine: 2003, 2011  Varicella vaccine: 2003, 2011  COVID-19 vaccine: 2019, 11/14/23  Jynneos: self reports that he has received this    I communicated with the patient at this visit.      Patient was seen on 7/25/24 via virtual visit.    35 minutes spent by me on the date of the encounter doing chart review, history and exam, documentation and further activities per the note    Juan Person MD  Infectious Diseases      Subjective       HPI:    As per initial ID consult note:  \"Angel Simeon is a 28 year old male with PMH including anxiety/depression, GERD, HSV infection, prior smoking, and recent diagnosis of HIV infection (10/2023 at primary care screening), who presents to ID clinic for HIV care.    Per patient report, doing ok since his diagnosis last month. Has not started any antiretroviral therapy yet.  Was not feeling well having abdominal discomfort (comes/goes) at time of HIV screening, did also go to hospital 10/12 (was given omeprazole which didn't change his symptoms much) who referred him to PCP for further work-up. Occasional nausea, no vomiting, occasional loose stool (non-watery, non-bloody). No fevers/chills, (in 8/2023 had 2 wks of cough, phlegm, fevers that self-limiting and resolved), " "+night sweats (since 8/2023) non-drenching and intermittent (~1x/week), no weight change, no current rashes, mild cervical gland swelling, no sore throat, no issues swallowing, intermittent headaches, no groin/anus discharge or lesions. In 6/2023 went to ED in [facility] after fainting, no official diagnosis at that time, just was given potassium and fluids, unsure if they screened for HIV at that time.     Thinks he has a good support system. Family lives in [place], boyfriend lives here. Remaining social history as noted below.\"    Interval events/updates:  7/25/24 update:   Since last visit doing well. Taking Biktarvy without issues. Says he did miss a dose of Biktarvy last week. Made appointment with [facility] pharmacist and talked about it already with her on 7/16 and they discussed med interactions, etc (not take Tums at same time as Biktarvy). He feels nausea in AMs and diarrhea (daily multiple a day, loose/watery) for past weeks, fiber sometimes helps. No dietary changes. If does have pain in abdomen usually epigastric. Taking PRN Tums. Sometimes worse after eating/drinking. 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 No other new symptomatic complaints. Says he has new insurance that will start via program  next month.     HIV past medical history:  Diagnosed: 10/2023  Approximate date of transmission:   Risk factors: sexual/MSM  Kashif CD4: 382 (11/2023)  Viral load at time of diagnosis: 188,000  Genotypes/mutation history: ordered but not resulted as of 11/2023  Treatment history: Biktarvy (11/2023-)  Prior OIs: none known  TB screening: never diagnosed/treated; previously screened 2019 negative for immigration status change    ART Adherence:  Current regimen: Biktarvy  Missed doses in last week, month: one  Estimates adherence at:   Medication side effects: none currently (prior had some bloating)  OTC medications:     Social history:  Background: Originally from [place], moved here 7/2023 (for work); moved [place] when 7 [place] " "(born in [place])  Lives in/with: lives with boyfriend (he's aware of the diagnosis, he since has tested negative)  Supports: n/a  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; 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  International travel: none recently - last in 2019 to [place] (3 months)  Pets: none  Alcohol: socially (once every 2 wks)  Tobacco: quit smoking tobacco (2023)  Other substances: occasional weed, never IVDU  Sexual Hx: in relationship, male  Sexual activity: yes with one stable partner (with multiple partners in [place] prior to this), current partner has been tested/negative and has been on PrEP  Adherence to condoms: intermittently  History of STI diagnosis and treatment: none prior    Prior PrEP: was on Descovy last in , but was only on it for a year and then discontinued when lost his job/insurance      PAST MEDICAL HISTORY  Otherwise as per HPI    PAST SURGICAL HISTORY  Otherwise as per HPI    ALLERGIES AND DRUG REACTIONS  No Known Allergies    FAMILY HISTORY  No known immunocompromising conditions or infections unless listed below  family history is not on file.    SOCIAL AND FUNCTIONAL HISTORY  Social History     Tobacco Use     Smoking status: Former     Current packs/day: 0.00     Types: Cigarettes     Start date: 2022     Quit date: 2023     Years since quittin.0     Passive exposure: Never     Smokeless tobacco: Never     Tobacco comments:     Did vaping for 2 years    Vaping Use     Vaping status: Former     Quit date: 2023     Substances: THC, Flavoring     Devices: Disposable   Substance Use Topics     Alcohol use: Not Currently     Drug use: Never     Otherwise as per HPI    CURRENT ANTIMICROBIALS  Other medications reviewed in EPIC  Biktarvy 1 tab daily    REVIEW OF SYSTEMS  ROS as above.    Objective   PHYSICAL EXAMINATION  (Exam limited due to the nature of video visit)  Ht 1.676 m (5' 6\")   Wt 62.6 kg (138 lb)   BMI 22.27 kg/m    Constitutional:  appearance not in " distress, appears comfortable   Pulmonary: appears to have unlabored breathing   Skin: no rashes visible on video  Neurologic: awake, alert and interactive      Other Significant Information (Labs, cultures, radiology, etc)    Recent micro reviewed:   9/18/23 syphilis screen: negative  9/18/23 HCV screen: negative  9/18/23 gonorrhea/chlamydia urine: negative  9/18/23 HIV screen: positive  9/18/23 HBsAg non-reactive  11/2/23 HIV VL: 188k  11/2/23 CD4 count 382 (12%)  11/2/23 syphilis screen: negative  11/9/23 chlamydia screen: positive  12/13/23 HIV   12/13/23 gonorrhea/chlamdyia screening (urine/rectal/throat): negative  12/13/23 quantiferon negative  3/13/2024 HIV ,   3/13/2024 CD4 count 500 (13%)  3/13/2024 GC screening (urine only) negative  3/13/2024 syphilis screen negative  6/26/24 HIV VL 94  6/26/24 CD4 count 589 (18%)  6/26/24 syphilis screen negative  6/26/24 gonorrhea/chlamydia (urine only): negative        Again, thank you for allowing me to participate in the care of your patient.      Sincerely,    Juan Person MD

## 2024-07-25 NOTE — PROGRESS NOTES
Virtual Visit Details     Type of service:  Video Visit   Video Start Time: 3:27pm  Video End Time:3:41pm    Originating Location (pt. Location): Home  Distant Location (provider location):  On-site  Platform used for Video Visit: Khari Simeon is here today for follow up of HIV care.    Assessment & Plan/Recommendations     ID problem list:  HIV, well controlled on antiretroviral therapy    Recs:  1.  HIV infection  - continue on Biktarvy 1 tab daily (gets it filled at [facility]) - said he would prefer 90-day supplies if possible (currently they are dispensing monthly supply)  - he has discussed administration and interactions with [facility] pharmacist last week  - sent repeat monitoring labs prior to next visit (discussed that we will plan to cut back on checking CD4 counts though as his has remained stable)  - follow up in approx 3 months with repeat labs at that time    2. Nausea, epigastric abdominal discomfort, diarrhea, elevated bilirubin  - check H.pylori stool, GI panel, stool O&P, stool microsporidia/cryptosporidium stains  - consider abdominal imaging and GI referral if not improving and above studies non-diagnostic  - discussed patient to call us if acute change or worsening pain    3. Health maintenance  - continue to follow with primary care provider (Dr. Wilson) and psych specialists for remaining management  - consider meningococcal vaccine at next visit and last dose of Gardisil-9; discussed that he could schedule 3rd dose of HPV series at his local pharmacy as well  - can consider HBV booster and then antibody recheck  - reports that he has received vaccine for Monkey pox      Colonoscopy: n/a  GC/Chlamydia: due for oral/rectal swabs, urine neg 3/13/2024  Syphilis: neg 3/13/2024  Quantiferon: negative 12/13/23  G6PD:   HLA-: neg 11/2/23  Crypto (if CD4<50):   Toxo IgG: nonreactive 11/2023  CMV IgG: reactive 11/2023  Hep A antibody : reactive 11/2023  Hep B antibody: HBsAg non-reactive, HBsAb  "reactive, HBcAB non-reactive 11/2023  Hep C antibody: non-reactivfe 11/2023  Hep A vaccine: received 4659-9476  Hep B vaccine: received 8661-4164  Tdap vaccine: 10/31/23  Flu vaccine: last 2019, 11/14/23  Prevnar PCV 20 conj vaccine: 12/13/23   Pneumovax-23 PPSV poly vaccine:   Meningococcal MenACWY (Menveo, Menactra) vaccine: 2011  HPV vaccine: 2011, 2023, - due for third dose  MMR vaccine: 2003, 2011  Varicella vaccine: 2003, 2011  COVID-19 vaccine: 2019, 11/14/23  Jynneos: self reports that he has received this    I communicated with the patient at this visit.      Patient was seen on 7/25/24 via virtual visit.    35 minutes spent by me on the date of the encounter doing chart review, history and exam, documentation and further activities per the note    Juan Person MD  Infectious Diseases      Subjective       HPI:    As per initial ID consult note:  \"Angel Simeon is a 28 year old male with PMH including anxiety/depression, GERD, HSV infection, prior smoking, and recent diagnosis of HIV infection (10/2023 at primary care screening), who presents to ID clinic for HIV care.    Per patient report, doing ok since his diagnosis last month. Has not started any antiretroviral therapy yet.  Was not feeling well having abdominal discomfort (comes/goes) at time of HIV screening, did also go to hospital 10/12 (was given omeprazole which didn't change his symptoms much) who referred him to PCP for further work-up. Occasional nausea, no vomiting, occasional loose stool (non-watery, non-bloody). No fevers/chills, (in 8/2023 had 2 wks of cough, phlegm, fevers that self-limiting and resolved), +night sweats (since 8/2023) non-drenching and intermittent (~1x/week), no weight change, no current rashes, mild cervical gland swelling, no sore throat, no issues swallowing, intermittent headaches, no groin/anus discharge or lesions. In 6/2023 went to ED in [facility] after fainting, no official diagnosis at that time, just was given " "potassium and fluids, unsure if they screened for HIV at that time.     Thinks he has a good support system. Family lives in [facility], boyfriend lives here. Remaining social history as noted below.\"    Interval events/updates:  7/25/24 update:   Since last visit doing well. Taking Biktarvy without issues. Says he did miss a dose of Biktarvy last week. Made appointment with [facility] pharmacist and talked about it already with her on 7/16 and they discussed med interactions, etc (not take Tums at same time as Biktarvy). He feels nausea in AMs and diarrhea (daily multiple a day, loose/watery) for past weeks, fiber sometimes helps. No dietary changes. If does have pain in abdomen usually epigastric. Taking PRN Tums. Sometimes worse after eating/drinking. 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 No other new symptomatic complaints. Says he has new insurance that will start via program  next month.     HIV past medical history:  Diagnosed: 10/2023  Approximate date of transmission:   Risk factors: sexual/MSM  Kashif CD4: 382 (11/2023)  Viral load at time of diagnosis: 188,000  Genotypes/mutation history: ordered but not resulted as of 11/2023  Treatment history: Biktarvy (11/2023-)  Prior OIs: none known  TB screening: never diagnosed/treated; previously screened 2019 negative for immigration status change    ART Adherence:  Current regimen: Biktarvy  Missed doses in last week, month: one  Estimates adherence at:   Medication side effects: none currently (prior had some bloating)  OTC medications:     Social history:  Background: Originally from [place], moved here 7/2023 (for work); moved to  when 5 yo (born in [place])  Lives in/with: lives with boyfriend (he's aware of the diagnosis, he since has tested negative)  Supports: n/a  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; 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  International travel: none recently - last in 2019 to [place] (3 months)  Pets: none  Alcohol: socially (once every 2 " "wks)  Tobacco: quit smoking tobacco (2023)  Other substances: occasional weed, never IVDU  Sexual Hx: in relationship, male  Sexual activity: yes with one stable partner (with multiple partners in [place] prior to this), current partner has been tested/negative and has been on PrEP  Adherence to condoms: intermittently  History of STI diagnosis and treatment: none prior    Prior PrEP: was on Descovy last in , but was only on it for a year and then discontinued when lost his job/insurance      PAST MEDICAL HISTORY  Otherwise as per HPI    PAST SURGICAL HISTORY  Otherwise as per HPI    ALLERGIES AND DRUG REACTIONS  No Known Allergies    FAMILY HISTORY  No known immunocompromising conditions or infections unless listed below  family history is not on file.    SOCIAL AND FUNCTIONAL HISTORY  Social History     Tobacco Use    Smoking status: Former     Current packs/day: 0.00     Types: Cigarettes     Start date: 2022     Quit date: 2023     Years since quittin.0     Passive exposure: Never    Smokeless tobacco: Never    Tobacco comments:     Did vaping for 2 years    Vaping Use    Vaping status: Former    Quit date: 2023    Substances: THC, Flavoring    Devices: Disposable   Substance Use Topics    Alcohol use: Not Currently    Drug use: Never     Otherwise as per HPI    CURRENT ANTIMICROBIALS  Other medications reviewed in EPIC  Biktarvy 1 tab daily    REVIEW OF SYSTEMS  ROS as above.    Objective   PHYSICAL EXAMINATION  (Exam limited due to the nature of video visit)  Ht 1.676 m (5' 6\")   Wt 62.6 kg (138 lb)   BMI 22.27 kg/m    Constitutional:  appearance not in distress, appears comfortable   Pulmonary: appears to have unlabored breathing   Skin: no rashes visible on video  Neurologic: awake, alert and interactive      Other Significant Information (Labs, cultures, radiology, etc)    Recent micro reviewed:   23 syphilis screen: negative  23 HCV screen: negative  23 " gonorrhea/chlamydia urine: negative  9/18/23 HIV screen: positive  9/18/23 HBsAg non-reactive  11/2/23 HIV VL: 188k  11/2/23 CD4 count 382 (12%)  11/2/23 syphilis screen: negative  11/9/23 chlamydia screen: positive  12/13/23 HIV   12/13/23 gonorrhea/chlamdyia screening (urine/rectal/throat): negative  12/13/23 quantiferon negative  3/13/2024 HIV ,   3/13/2024 CD4 count 500 (13%)  3/13/2024 GC screening (urine only) negative  3/13/2024 syphilis screen negative  6/26/24 HIV VL 94  6/26/24 CD4 count 589 (18%)  6/26/24 syphilis screen negative  6/26/24 gonorrhea/chlamydia (urine only): negative

## 2024-08-01 ENCOUNTER — MYC REFILL (OUTPATIENT)
Dept: INFECTIOUS DISEASES | Facility: CLINIC | Age: 29
End: 2024-08-01

## 2024-08-01 DIAGNOSIS — Z21 ASYMPTOMATIC HUMAN IMMUNODEFICIENCY VIRUS (HIV) INFECTION STATUS (H): ICD-10-CM

## 2024-08-02 NOTE — TELEPHONE ENCOUNTER
Sent patient a Jobfox message explaining the process of calling his pharmacy to request that the prescription is transferred over from [facility] to avoid duplicate rx issues.

## 2024-08-05 ENCOUNTER — TELEPHONE (OUTPATIENT)
Dept: INFECTIOUS DISEASES | Facility: CLINIC | Age: 29
End: 2024-08-05

## 2024-08-05 ENCOUNTER — MYC REFILL (OUTPATIENT)
Dept: INFECTIOUS DISEASES | Facility: CLINIC | Age: 29
End: 2024-08-05

## 2024-08-05 ENCOUNTER — MYC MEDICAL ADVICE (OUTPATIENT)
Dept: INFECTIOUS DISEASES | Facility: CLINIC | Age: 29
End: 2024-08-05

## 2024-08-05 DIAGNOSIS — Z21 ASYMPTOMATIC HUMAN IMMUNODEFICIENCY VIRUS (HIV) INFECTION STATUS (H): ICD-10-CM

## 2024-08-05 NOTE — TELEPHONE ENCOUNTER
[facility] Call Center    Phone Message    May a detailed message be left on voicemail: yes     Reason for Call: Medication Question or concern regarding medication   Prescription Clarification  Name of Medication: bictegravir-emtricitabine-tenofovir (BIKTARVY) -25 MG per tablet   Prescribing Provider: Dr. Person   Pharmacy: [facility]   What on the order needs clarification? Pt called and stated the [facility] [place] that [facility] is out too. Pt called and stated the [facility] pharmacy does have it in stock, pt would like this script to be sent there asap, pt only has one pill left

## 2024-08-26 ENCOUNTER — OFFICE VISIT (OUTPATIENT)
Dept: URGENT CARE | Facility: URGENT CARE | Age: 29
End: 2024-08-26
Payer: COMMERCIAL

## 2024-08-26 ENCOUNTER — HOSPITAL ENCOUNTER (OUTPATIENT)
Dept: CT IMAGING | Facility: CLINIC | Age: 29
Discharge: HOME | End: 2024-08-26
Attending: FAMILY MEDICINE | Admitting: FAMILY MEDICINE
Payer: COMMERCIAL

## 2024-08-26 ENCOUNTER — OFFICE VISIT (OUTPATIENT)
Dept: PEDIATRICS | Facility: CLINIC | Age: 29
End: 2024-08-26
Payer: COMMERCIAL

## 2024-08-26 VITALS
HEART RATE: 78 BPM | SYSTOLIC BLOOD PRESSURE: 124 MMHG | TEMPERATURE: 97.2 F | DIASTOLIC BLOOD PRESSURE: 83 MMHG | OXYGEN SATURATION: 99 %

## 2024-08-26 VITALS
HEIGHT: 66 IN | TEMPERATURE: 97.9 F | RESPIRATION RATE: 20 BRPM | SYSTOLIC BLOOD PRESSURE: 115 MMHG | DIASTOLIC BLOOD PRESSURE: 77 MMHG | HEART RATE: 80 BPM | WEIGHT: 138 LBS | BODY MASS INDEX: 22.18 KG/M2 | OXYGEN SATURATION: 100 %

## 2024-08-26 DIAGNOSIS — R51.9 ACUTE NONINTRACTABLE HEADACHE, UNSPECIFIED HEADACHE TYPE: Primary | ICD-10-CM

## 2024-08-26 DIAGNOSIS — A64 STD (SEXUALLY TRANSMITTED DISEASE): Primary | ICD-10-CM

## 2024-08-26 DIAGNOSIS — G44.89 OTHER HEADACHE SYNDROME: ICD-10-CM

## 2024-08-26 LAB
ALBUMIN SERPL BCG-MCNC: 4.2 G/DL (ref 3.5–5.2)
ALBUMIN UR-MCNC: 30 MG/DL
ALP SERPL-CCNC: 378 U/L (ref 40–150)
ALT SERPL W P-5'-P-CCNC: 59 U/L (ref 0–70)
ANION GAP SERPL CALCULATED.3IONS-SCNC: 10 MMOL/L (ref 7–15)
APPEARANCE UR: CLEAR
AST SERPL W P-5'-P-CCNC: 28 U/L (ref 0–45)
BACTERIA #/AREA URNS HPF: ABNORMAL /HPF
BASOPHILS # BLD AUTO: 0 10E3/UL (ref 0–0.2)
BASOPHILS NFR BLD AUTO: 1 %
BILIRUB SERPL-MCNC: 0.5 MG/DL
BILIRUB UR QL STRIP: NEGATIVE
BUN SERPL-MCNC: 5.6 MG/DL (ref 6–20)
CALCIUM SERPL-MCNC: 9.5 MG/DL (ref 8.8–10.4)
CHLORIDE SERPL-SCNC: 102 MMOL/L (ref 98–107)
COLOR UR AUTO: YELLOW
CREAT SERPL-MCNC: 0.77 MG/DL (ref 0.67–1.17)
CRP SERPL-MCNC: 37.04 MG/L
EGFRCR SERPLBLD CKD-EPI 2021: >90 ML/MIN/1.73M2
EOSINOPHIL # BLD AUTO: 0.1 10E3/UL (ref 0–0.7)
EOSINOPHIL NFR BLD AUTO: 1 %
ERYTHROCYTE [DISTWIDTH] IN BLOOD BY AUTOMATED COUNT: 12.9 % (ref 10–15)
ERYTHROCYTE [SEDIMENTATION RATE] IN BLOOD BY WESTERGREN METHOD: 25 MM/HR (ref 0–15)
GLUCOSE SERPL-MCNC: 83 MG/DL (ref 70–99)
GLUCOSE UR STRIP-MCNC: NEGATIVE MG/DL
HCO3 SERPL-SCNC: 25 MMOL/L (ref 22–29)
HCT VFR BLD AUTO: 45.8 % (ref 40–53)
HGB BLD-MCNC: 15.6 G/DL (ref 13.3–17.7)
HGB UR QL STRIP: NEGATIVE
IMM GRANULOCYTES # BLD: 0 10E3/UL
IMM GRANULOCYTES NFR BLD: 0 %
KETONES UR STRIP-MCNC: NEGATIVE MG/DL
LEUKOCYTE ESTERASE UR QL STRIP: NEGATIVE
LYMPHOCYTES # BLD AUTO: 2.2 10E3/UL (ref 0.8–5.3)
LYMPHOCYTES NFR BLD AUTO: 33 %
MCH RBC QN AUTO: 31.3 PG (ref 26.5–33)
MCHC RBC AUTO-ENTMCNC: 34.1 G/DL (ref 31.5–36.5)
MCV RBC AUTO: 92 FL (ref 78–100)
MONOCYTES # BLD AUTO: 0.8 10E3/UL (ref 0–1.3)
MONOCYTES NFR BLD AUTO: 11 %
NEUTROPHILS # BLD AUTO: 3.6 10E3/UL (ref 1.6–8.3)
NEUTROPHILS NFR BLD AUTO: 53 %
NITRATE UR QL: NEGATIVE
PH UR STRIP: 7.5 [PH] (ref 5–7)
PLATELET # BLD AUTO: 205 10E3/UL (ref 150–450)
POTASSIUM SERPL-SCNC: 4.4 MMOL/L (ref 3.4–5.3)
PROT SERPL-MCNC: 8.2 G/DL (ref 6.4–8.3)
RBC # BLD AUTO: 4.99 10E6/UL (ref 4.4–5.9)
RBC #/AREA URNS AUTO: ABNORMAL /HPF
SODIUM SERPL-SCNC: 137 MMOL/L (ref 135–145)
SP GR UR STRIP: 1.02 (ref 1–1.03)
SQUAMOUS #/AREA URNS AUTO: ABNORMAL /LPF
UROBILINOGEN UR STRIP-ACNC: 1 E.U./DL
WBC # BLD AUTO: 6.7 10E3/UL (ref 4–11)
WBC #/AREA URNS AUTO: ABNORMAL /HPF

## 2024-08-26 PROCEDURE — 87591 N.GONORRHOEAE DNA AMP PROB: CPT | Performed by: NURSE PRACTITIONER

## 2024-08-26 PROCEDURE — 85652 RBC SED RATE AUTOMATED: CPT | Performed by: FAMILY MEDICINE

## 2024-08-26 PROCEDURE — 80053 COMPREHEN METABOLIC PANEL: CPT | Performed by: FAMILY MEDICINE

## 2024-08-26 PROCEDURE — 70450 CT HEAD/BRAIN W/O DYE: CPT

## 2024-08-26 PROCEDURE — 96372 THER/PROPH/DIAG INJ SC/IM: CPT | Performed by: FAMILY MEDICINE

## 2024-08-26 PROCEDURE — 86140 C-REACTIVE PROTEIN: CPT | Performed by: FAMILY MEDICINE

## 2024-08-26 PROCEDURE — 87491 CHLMYD TRACH DNA AMP PROBE: CPT | Performed by: NURSE PRACTITIONER

## 2024-08-26 PROCEDURE — 85025 COMPLETE CBC W/AUTO DIFF WBC: CPT | Performed by: FAMILY MEDICINE

## 2024-08-26 PROCEDURE — 99215 OFFICE O/P EST HI 40 MIN: CPT | Mod: 25 | Performed by: FAMILY MEDICINE

## 2024-08-26 PROCEDURE — 36415 COLL VENOUS BLD VENIPUNCTURE: CPT | Performed by: FAMILY MEDICINE

## 2024-08-26 PROCEDURE — 99213 OFFICE O/P EST LOW 20 MIN: CPT | Performed by: NURSE PRACTITIONER

## 2024-08-26 PROCEDURE — 81001 URINALYSIS AUTO W/SCOPE: CPT | Performed by: NURSE PRACTITIONER

## 2024-08-26 RX ORDER — NAPROXEN 250 MG/1
250 TABLET ORAL 3 TIMES DAILY PRN
Qty: 30 TABLET | Refills: 1 | Status: SHIPPED | OUTPATIENT
Start: 2024-08-26

## 2024-08-26 RX ORDER — DOXYCYCLINE 100 MG/1
100 CAPSULE ORAL 2 TIMES DAILY
Qty: 14 CAPSULE | Refills: 0 | Status: SHIPPED | OUTPATIENT
Start: 2024-08-26 | End: 2024-09-02

## 2024-08-26 RX ORDER — KETOROLAC TROMETHAMINE 30 MG/ML
15 INJECTION, SOLUTION INTRAMUSCULAR; INTRAVENOUS ONCE
Status: DISCONTINUED | OUTPATIENT
Start: 2024-08-26 | End: 2024-08-26

## 2024-08-26 RX ORDER — KETOROLAC TROMETHAMINE 30 MG/ML
15 INJECTION, SOLUTION INTRAMUSCULAR; INTRAVENOUS ONCE
Status: COMPLETED | OUTPATIENT
Start: 2024-08-26 | End: 2024-08-26

## 2024-08-26 RX ADMIN — KETOROLAC TROMETHAMINE 15 MG: 30 INJECTION, SOLUTION INTRAMUSCULAR; INTRAVENOUS at 14:12

## 2024-08-26 ASSESSMENT — PAIN SCALES - GENERAL: PAINLEVEL: SEVERE PAIN (7)

## 2024-08-26 NOTE — LETTER
August 26, 2024      Angel Simeon  [address]    [facility] [place] [facility] [address]        To Whom It May Concern:    Angel Simeon  was seen on 8/26/2024.  Please excuse him until 8/27/2024 due to medical illness.  Thank you.        Sincerely,        Nikita Null MD

## 2024-08-26 NOTE — PROGRESS NOTES
[facility] Visit    Assessment & Plan   Problem List Items Addressed This Visit    None  Visit Diagnoses       STD (sexually transmitted disease)    -  Primary    Relevant Medications    cefTRIAXone (ROCEPHIN) in NS for IM administration 500 mg (Completed)    doxycycline hyclate (VIBRAMYCIN) 100 MG capsule    Other Relevant Orders    CBC with platelets differential (Completed)    Comprehensive metabolic panel (Completed)    CRP inflammation (Completed)    Erythrocyte sedimentation rate auto (Completed)    Other headache syndrome        Relevant Medications    ketorolac (TORADOL) injection 15 mg (Completed)    naproxen (NAPROSYN) 250 MG tablet    Other Relevant Orders    CBC with platelets differential (Completed)    Comprehensive metabolic panel (Completed)    CRP inflammation (Completed)    Erythrocyte sedimentation rate auto (Completed)    CT Head w/o Contrast (Completed)             40 minutes were spent doing chart review, history and exam, documentation and further activities per the note.          Sameer Ortiz is a 29 year old, presenting for the following health issues:  Headache (Patient is here for headache x 3 days.)    HPI     Headache  Onset/Duration: 3 days  Description:   Location: bilateral in the temporal area  Character: throbbing pain, sharp pain  Frequency:  Intermittent  Duration:  3 days  Wake with headaches:  YES  Able to do daily activities when headache present:  no   Intensity: moderate  Progression of Symptoms:  same  Accompanying Signs & Symptoms:  Stiff neck: no   Neck or upper back pain: YES           Sinus or URI symptoms: no   Fever: no   Nausea or vomiting: YES- nausea yes vomited on Saturday    Dizziness: YES  Numbness/tingling: no   Weakness: no   Visual changes: YES  History:   Head trauma: no   Family history of migraines: no  Daily pain medication use: no           Previous tests for headaches: no            Neurologist evaluations: no  "  Precipitating or alleviating factors:   Does light make it worse: no   Does sound make it worse: YES  Does sleep help: YES  Therapies Tried and outcome: Ibuprofen (Advil, Motrin) and Tylenol    29-year-old male who is HIV positive on Biktarvy presented ambulatory to [facility] via urgent care complaining of severe headache x 3 days.  He described his headache as bitemporal, intermittent sharp pain radiating to the occiput, worse with thinking/concentrating and talking.  No trauma or injury or fall.  Patient works as a .  No history of migraine.  Vomited once 3 days ago.  Exposed to a new partner 4 days ago (patient only has sex with men), concern regarding STD.  Patient reports he has a history of clenching his teeth at night when he sleeps, and has bilateral TMJ discomfort.  He also complaining of urinary frequency and urgency, however no dysuria or hematuria or pyuria.  No penile discharge or bleeding.  No abdominal or back pain.  No change in vision or speech.  Patient does smoke marijuana.  No current fever or chills, nausea or vomiting, no bowel movement problems.  No dizziness or vertigo or syncope.          Objective    /77 (BP Location: Right arm, Patient Position: Sitting, Cuff Size: Adult Regular)   Pulse 80   Temp 97.9  F (36.6  C) (Temporal)   Resp 20   Ht 1.676 m (5' 6\")   Wt 62.6 kg (138 lb)   SpO2 100%   BMI 22.27 kg/m    Body mass index is 22.27 kg/m .  Physical Exam   GENERAL: alert and mild distress, GCS 15, no cyanosis or accessory muscle use, no meningeal signs, moist mucous membrane  EYES: Eyes grossly normal to inspection, PERRL and conjunctivae and sclerae normal, no nystagmus  HENT: ear canals and TM's normal, nose and mouth without ulcers or lesions; mild tenderness bilateral temporal region without abnormal pulsation; bilateral TMJ tenderness but no crepitus or subluxation/dislocation with opening or closing of the mouth; no obvious dental caries  NECK: no adenopathy, " no asymmetry, masses, or scars  RESP: lungs clear to auscultation - no rales, rhonchi or wheezes  CV: regular rate and rhythm, normal S1 S2, no S3 or S4, no murmur, click or rub, no peripheral edema  ABDOMEN: soft, nontender, no hepatosplenomegaly, no masses and bowel sounds normal, negative Miller negative McBurney, no CVA tenderness, no hernia, no ascites  MS: no gross musculoskeletal defects noted, no edema  SKIN: no suspicious lesions or rashes  NEURO: Normal strength and tone, mentation intact and speech normal; cranials 2-12 intact, facial symmetry with pronator drift,, normal gait, normal heel/toe walk  BACK: no CVA tenderness, no paralumbar tenderness    Results for orders placed or performed in visit on 08/26/24 (from the past 24 hour(s))   CBC with platelets differential    Narrative    The following orders were created for panel order CBC with platelets differential.  Procedure                               Abnormality         Status                     ---------                               -----------         ------                     CBC with platelets and d...[[id number]]                      Final result                 Please view results for these tests on the individual orders.   Comprehensive metabolic panel   Result Value Ref Range    Sodium 137 135 - 145 mmol/L    Potassium 4.4 3.4 - 5.3 mmol/L    Carbon Dioxide (CO2) 25 22 - 29 mmol/L    Anion Gap 10 7 - 15 mmol/L    Urea Nitrogen 5.6 (L) 6.0 - 20.0 mg/dL    Creatinine 0.77 0.67 - 1.17 mg/dL    GFR Estimate >90 >60 mL/min/1.73m2    Calcium 9.5 8.8 - 10.4 mg/dL    Chloride 102 98 - 107 mmol/L    Glucose 83 70 - 99 mg/dL    Alkaline Phosphatase 378 (H) 40 - 150 U/L    AST 28 0 - 45 U/L    ALT 59 0 - 70 U/L    Protein Total 8.2 6.4 - 8.3 g/dL    Albumin 4.2 3.5 - 5.2 g/dL    Bilirubin Total 0.5 <=1.2 mg/dL   CRP inflammation   Result Value Ref Range    CRP Inflammation 37.04 (H) <5.00 mg/L   Erythrocyte sedimentation rate auto   Result Value Ref  Range    Erythrocyte Sedimentation Rate 25 (H) 0 - 15 mm/hr   CBC with platelets and differential   Result Value Ref Range    WBC Count 6.7 4.0 - 11.0 10e3/uL    RBC Count 4.99 4.40 - 5.90 10e6/uL    Hemoglobin 15.6 13.3 - 17.7 g/dL    Hematocrit 45.8 40.0 - 53.0 %    MCV 92 78 - 100 fL    MCH 31.3 26.5 - 33.0 pg    MCHC 34.1 31.5 - 36.5 g/dL    RDW 12.9 10.0 - 15.0 %    Platelet Count 205 150 - 450 10e3/uL    % Neutrophils 53 %    % Lymphocytes 33 %    % Monocytes 11 %    % Eosinophils 1 %    % Basophils 1 %    % Immature Granulocytes 0 %    Absolute Neutrophils 3.6 1.6 - 8.3 10e3/uL    Absolute Lymphocytes 2.2 0.8 - 5.3 10e3/uL    Absolute Monocytes 0.8 0.0 - 1.3 10e3/uL    Absolute Eosinophils 0.1 0.0 - 0.7 10e3/uL    Absolute Basophils 0.0 0.0 - 0.2 10e3/uL    Absolute Immature Granulocytes 0.0 <=0.4 10e3/uL   CT Head w/o Contrast    Narrative    CT SCAN OF THE HEAD WITHOUT CONTRAST August 26, 2024 2:12 PM     HISTORY: Severe headache for two days, bitemporal to occiput  intermittently; patient is HIV +, no trauma. Rule out subarachnoid  hemorrhage and other intracranial lesions. Headache, rule out  subarachnoid hemorrhage. No sudden severe headache. Other headache  syndrome.    TECHNIQUE: Axial images of the head and coronal reformations without  IV contrast material. Radiation dose for this scan was reduced using  automated exposure control, adjustment of the mA and/or kV according  to patient size, or iterative reconstruction technique.    COMPARISON: None.    FINDINGS: No evidence of hemorrhage, mass, or hydrocephalus.  Parenchyma within normal limits. No acute osseous abnormality.      Impression    IMPRESSION: No acute intracranial abnormality.    MARTITA SUERO MD         SYSTEM ID:  [id number]         Impression: STD exposure.  Likely chlamydial urethritis.  Headache likely secondary to tension headache; may be related to teeth clenching and TMJ dysfunction.  No acute intracranial bleed or abnormalities  on CT scan    PLAN: Patient was given ceftriaxone 500 mg IM to cover for gonorrhea; doxycycline 100 mg p.o. twice daily prescribed for chlamydia.  He was also given Toradol 15 mg IM for his headache which was helpful in reducing his headache from a 7/10 to 4/10 intensity.  Patient was feeling better by the time of discharge.  He was prescribed naproxen to use 250 mg p.o. 3 times daily as needed with meals or 2 tabs twice daily as needed for his headache/TMJ syndrome.  Patient is advised to see his dentist regarding possible need for mouthguard to help reduce headache associated with teeth clenching.  Follow-up with primary care physician within 1 week if new problems arise.  Warning signs and symptoms explained, to be seen ASAP if worsening.    Signed Electronically by: Nikita Null MD

## 2024-08-26 NOTE — PROGRESS NOTES
Assessment & Plan   Problem List Items Addressed This Visit    None  Visit Diagnoses       Acute nonintractable headache, unspecified headache type    -  Primary           29-year-old male patient presents to urgent care today chief concern for new onset headache which patient indicates began on Friday and is the worst headache he has ever had he describes it as wrapping around his temples to the back of his head intermittent blurred vision no numbness tingling focal weakness or difficulty with ambulation.  Patient did take some over-the-counter pain reliever without relief of symptoms.  He also describes cervical neck pain without injury.  Patient will be transferred to the [facility] for further evaluation and treatment.           No follow-ups on file.      Subjective   Angel is a 29 year old, presenting for the following health issues:  Headache (STARTED FRIDAY NIGHT - WITH NECK PAIN.  Ibuprofen/tylenol with no relief./Urinary frequency/Less BM than usual - 3 times daily down to 1 time daily)        4/3/2024    10:57 AM   Additional Questions   Roomed by Zuly GROVES               Review of Systems  Constitutional, HEENT, cardiovascular, pulmonary, GI, , musculoskeletal, neuro, skin, endocrine and psych systems are negative, except as otherwise noted.      Objective    /83   Pulse 78   Temp 97.2  F (36.2  C) (Temporal)   SpO2 99%   There is no height or weight on file to calculate BMI.  Physical Exam   GENERAL: alert and no distress  EYES: Eyes grossly normal to inspection, PERRL and conjunctivae and sclerae normal  HENT: ear canals and TM's normal, nose and mouth without ulcers or lesions  NECK: no adenopath  RESP: lungs clear to auscultation - no rales, rhonchi or wheezes  CV: regular rate and rhythm, normal S1 S2, no S3 or S4, no murmur, click or rub, no peripheral edema  MS: no gross musculoskeletal defects noted, no edema, cervical neck pain upon palpation good range of motion.  NEURO: Normal  strength and tone, sensory exam grossly normal, mentation intact, speech normal, cranial nerves 2-12 intact, and DTR's normal and symmetric negative Romberg's  PSYCH: mentation appears normal, affect normal/bright            Signed Electronically by: Meghna Bradley NP

## 2024-08-27 LAB
C TRACH DNA SPEC QL PROBE+SIG AMP: NEGATIVE
N GONORRHOEA DNA SPEC QL NAA+PROBE: NEGATIVE

## 2024-08-30 ENCOUNTER — HOSPITAL ENCOUNTER (EMERGENCY)
Facility: CLINIC | Age: 29
Discharge: HOME | End: 2024-08-31
Payer: COMMERCIAL

## 2024-08-30 VITALS
TEMPERATURE: 97.8 F | SYSTOLIC BLOOD PRESSURE: 123 MMHG | HEIGHT: 66 IN | BODY MASS INDEX: 21.21 KG/M2 | WEIGHT: 132 LBS | HEART RATE: 82 BPM | OXYGEN SATURATION: 99 % | DIASTOLIC BLOOD PRESSURE: 83 MMHG | RESPIRATION RATE: 18 BRPM

## 2024-08-30 ASSESSMENT — ACTIVITIES OF DAILY LIVING (ADL)
ADLS_ACUITY_SCORE: 35

## 2024-08-30 ASSESSMENT — COLUMBIA-SUICIDE SEVERITY RATING SCALE - C-SSRS
2. HAVE YOU ACTUALLY HAD ANY THOUGHTS OF KILLING YOURSELF IN THE PAST MONTH?: NO
1. IN THE PAST MONTH, HAVE YOU WISHED YOU WERE DEAD OR WISHED YOU COULD GO TO SLEEP AND NOT WAKE UP?: NO
6. HAVE YOU EVER DONE ANYTHING, STARTED TO DO ANYTHING, OR PREPARED TO DO ANYTHING TO END YOUR LIFE?: NO

## 2024-08-30 NOTE — ED TRIAGE NOTES
Pt reports developing a HA last Friday. Pt notes getting abx on Monday. Pt reports no improvement with the HA. Pt notes also having blisters around his mouth and reports concerns of monkey pox.      Triage Assessment (Adult)       Row Name 08/30/24 2749          Triage Assessment    Airway WDL WDL        Respiratory WDL    Respiratory WDL WDL        Skin Circulation/Temperature WDL    Skin Circulation/Temperature WDL WDL        Cardiac WDL    Cardiac WDL WDL        Peripheral/Neurovascular WDL    Peripheral Neurovascular WDL WDL        Cognitive/Neuro/Behavioral WDL    Cognitive/Neuro/Behavioral WDL X  HA

## 2024-08-31 ASSESSMENT — ACTIVITIES OF DAILY LIVING (ADL)
ADLS_ACUITY_SCORE: 35

## 2024-09-17 ENCOUNTER — MYC REFILL (OUTPATIENT)
Dept: FAMILY MEDICINE | Facility: CLINIC | Age: 29
End: 2024-09-17
Payer: COMMERCIAL

## 2024-09-17 DIAGNOSIS — B00.9 HERPES SIMPLEX VIRUS INFECTION: ICD-10-CM

## 2024-09-18 RX ORDER — ACYCLOVIR 400 MG/1
400 TABLET ORAL EVERY 8 HOURS
Qty: 15 TABLET | Refills: 0 | Status: SHIPPED | OUTPATIENT
Start: 2024-09-18

## 2024-10-08 ENCOUNTER — PATIENT OUTREACH (OUTPATIENT)
Dept: CARE COORDINATION | Facility: CLINIC | Age: 29
End: 2024-10-08
Payer: COMMERCIAL

## 2024-10-22 ENCOUNTER — PATIENT OUTREACH (OUTPATIENT)
Dept: CARE COORDINATION | Facility: CLINIC | Age: 29
End: 2024-10-22
Payer: COMMERCIAL

## 2024-12-15 ENCOUNTER — HEALTH MAINTENANCE LETTER (OUTPATIENT)
Age: 29
End: 2024-12-15

## 2025-04-26 ENCOUNTER — HOSPITAL ENCOUNTER (EMERGENCY)
Facility: CLINIC | Age: 30
Discharge: HOME | End: 2025-04-26
Attending: EMERGENCY MEDICINE | Admitting: EMERGENCY MEDICINE
Payer: COMMERCIAL

## 2025-04-26 ENCOUNTER — APPOINTMENT (OUTPATIENT)
Dept: GENERAL RADIOLOGY | Facility: CLINIC | Age: 30
End: 2025-04-26
Attending: EMERGENCY MEDICINE
Payer: COMMERCIAL

## 2025-04-26 VITALS
RESPIRATION RATE: 18 BRPM | SYSTOLIC BLOOD PRESSURE: 113 MMHG | OXYGEN SATURATION: 100 % | TEMPERATURE: 98.3 F | HEART RATE: 124 BPM | DIASTOLIC BLOOD PRESSURE: 94 MMHG

## 2025-04-26 DIAGNOSIS — R50.9 FEVER, UNSPECIFIED FEVER CAUSE: ICD-10-CM

## 2025-04-26 LAB
ALBUMIN SERPL BCG-MCNC: 5.2 G/DL (ref 3.5–5.2)
ALP SERPL-CCNC: 100 U/L (ref 40–150)
ALT SERPL W P-5'-P-CCNC: 21 U/L (ref 0–70)
ANION GAP SERPL CALCULATED.3IONS-SCNC: 22 MMOL/L (ref 7–15)
AST SERPL W P-5'-P-CCNC: 30 U/L (ref 0–45)
BASOPHILS # BLD AUTO: 0.1 10E3/UL (ref 0–0.2)
BASOPHILS NFR BLD AUTO: 1 %
BILIRUB SERPL-MCNC: 1.2 MG/DL
BUN SERPL-MCNC: 12.1 MG/DL (ref 6–20)
CALCIUM SERPL-MCNC: 10 MG/DL (ref 8.8–10.4)
CHLORIDE SERPL-SCNC: 97 MMOL/L (ref 98–107)
CREAT SERPL-MCNC: 0.73 MG/DL (ref 0.67–1.17)
EGFRCR SERPLBLD CKD-EPI 2021: >90 ML/MIN/1.73M2
EOSINOPHIL # BLD AUTO: 0 10E3/UL (ref 0–0.7)
EOSINOPHIL NFR BLD AUTO: 0 %
ERYTHROCYTE [DISTWIDTH] IN BLOOD BY AUTOMATED COUNT: 13.3 % (ref 10–15)
FLUAV RNA SPEC QL NAA+PROBE: NEGATIVE
FLUBV RNA RESP QL NAA+PROBE: NEGATIVE
GLUCOSE SERPL-MCNC: 113 MG/DL (ref 70–99)
HCO3 SERPL-SCNC: 20 MMOL/L (ref 22–29)
HCT VFR BLD AUTO: 50.1 % (ref 40–53)
HGB BLD-MCNC: 17.4 G/DL (ref 13.3–17.7)
IMM GRANULOCYTES # BLD: 0 10E3/UL
IMM GRANULOCYTES NFR BLD: 0 %
LIPASE SERPL-CCNC: 16 U/L (ref 13–60)
LYMPHOCYTES # BLD AUTO: 1.9 10E3/UL (ref 0.8–5.3)
LYMPHOCYTES NFR BLD AUTO: 19 %
MCH RBC QN AUTO: 31.2 PG (ref 26.5–33)
MCHC RBC AUTO-ENTMCNC: 34.7 G/DL (ref 31.5–36.5)
MCV RBC AUTO: 90 FL (ref 78–100)
MONOCYTES # BLD AUTO: 0.7 10E3/UL (ref 0–1.3)
MONOCYTES NFR BLD AUTO: 7 %
NEUTROPHILS # BLD AUTO: 7.5 10E3/UL (ref 1.6–8.3)
NEUTROPHILS NFR BLD AUTO: 74 %
NRBC # BLD AUTO: 0 10E3/UL
NRBC BLD AUTO-RTO: 0 /100
PLATELET # BLD AUTO: 242 10E3/UL (ref 150–450)
POTASSIUM SERPL-SCNC: 3.8 MMOL/L (ref 3.4–5.3)
PROT SERPL-MCNC: 8.1 G/DL (ref 6.4–8.3)
RBC # BLD AUTO: 5.58 10E6/UL (ref 4.4–5.9)
RSV RNA SPEC NAA+PROBE: NEGATIVE
SARS-COV-2 RNA RESP QL NAA+PROBE: NEGATIVE
SODIUM SERPL-SCNC: 139 MMOL/L (ref 135–145)
TROPONIN T SERPL HS-MCNC: <6 NG/L
WBC # BLD AUTO: 10.1 10E3/UL (ref 4–11)

## 2025-04-26 PROCEDURE — 93010 ELECTROCARDIOGRAM REPORT: CPT | Mod: 76 | Performed by: EMERGENCY MEDICINE

## 2025-04-26 PROCEDURE — 36415 COLL VENOUS BLD VENIPUNCTURE: CPT | Performed by: EMERGENCY MEDICINE

## 2025-04-26 PROCEDURE — 250N000011 HC RX IP 250 OP 636: Performed by: EMERGENCY MEDICINE

## 2025-04-26 PROCEDURE — 99284 EMERGENCY DEPT VISIT MOD MDM: CPT | Performed by: EMERGENCY MEDICINE

## 2025-04-26 PROCEDURE — 87637 SARSCOV2&INF A&B&RSV AMP PRB: CPT | Performed by: EMERGENCY MEDICINE

## 2025-04-26 PROCEDURE — 93005 ELECTROCARDIOGRAM TRACING: CPT | Performed by: EMERGENCY MEDICINE

## 2025-04-26 PROCEDURE — 84295 ASSAY OF SERUM SODIUM: CPT | Performed by: EMERGENCY MEDICINE

## 2025-04-26 PROCEDURE — 93010 ELECTROCARDIOGRAM REPORT: CPT | Performed by: EMERGENCY MEDICINE

## 2025-04-26 PROCEDURE — 93005 ELECTROCARDIOGRAM TRACING: CPT | Mod: 76 | Performed by: EMERGENCY MEDICINE

## 2025-04-26 PROCEDURE — 258N000003 HC RX IP 258 OP 636: Performed by: EMERGENCY MEDICINE

## 2025-04-26 PROCEDURE — 84484 ASSAY OF TROPONIN QUANT: CPT | Performed by: EMERGENCY MEDICINE

## 2025-04-26 PROCEDURE — 85004 AUTOMATED DIFF WBC COUNT: CPT | Performed by: EMERGENCY MEDICINE

## 2025-04-26 PROCEDURE — 250N000013 HC RX MED GY IP 250 OP 250 PS 637: Performed by: EMERGENCY MEDICINE

## 2025-04-26 PROCEDURE — 71046 X-RAY EXAM CHEST 2 VIEWS: CPT

## 2025-04-26 PROCEDURE — 99285 EMERGENCY DEPT VISIT HI MDM: CPT | Mod: 25 | Performed by: EMERGENCY MEDICINE

## 2025-04-26 PROCEDURE — 71046 X-RAY EXAM CHEST 2 VIEWS: CPT | Mod: 26 | Performed by: RADIOLOGY

## 2025-04-26 PROCEDURE — 83690 ASSAY OF LIPASE: CPT | Performed by: EMERGENCY MEDICINE

## 2025-04-26 RX ORDER — ONDANSETRON 2 MG/ML
4 INJECTION INTRAMUSCULAR; INTRAVENOUS ONCE
Status: COMPLETED | OUTPATIENT
Start: 2025-04-26 | End: 2025-04-26

## 2025-04-26 RX ORDER — ACETAMINOPHEN 500 MG
1000 TABLET ORAL ONCE
Status: COMPLETED | OUTPATIENT
Start: 2025-04-26 | End: 2025-04-26

## 2025-04-26 RX ADMIN — ONDANSETRON 4 MG: 2 INJECTION, SOLUTION INTRAMUSCULAR; INTRAVENOUS at 16:21

## 2025-04-26 RX ADMIN — ACETAMINOPHEN 1000 MG: 500 TABLET ORAL at 16:48

## 2025-04-26 RX ADMIN — SODIUM CHLORIDE 1000 ML: 0.9 INJECTION, SOLUTION INTRAVENOUS at 16:23

## 2025-04-26 ASSESSMENT — ACTIVITIES OF DAILY LIVING (ADL)
ADLS_ACUITY_SCORE: 41

## 2025-04-26 NOTE — ED TRIAGE NOTES
Pt states that yesterday had too much to drink- felt nauseous, headache, emesis for the last two hours.

## 2025-04-26 NOTE — ED PROVIDER NOTES
ED Provider Note  [facility]      History     Chief Complaint   Patient presents with    Nausea & Vomiting     HPI  Angel Simeon is a 30 year old male who presents for nausea, vomiting.  He reports he was feeling well yesterday and then today developed nausea, vomiting.  There has been associated headache, body aches.  He notes having developed some intermittent right chest pressure as well.  He states that he has not been able to eat or drink, has developed lightheadedness throughout the day as well.  No known sick contacts.  No cough, sore throat. Denies abdominal pain. Denies changes in BM's or urination. No known sick contacts.     Past Medical History  No past medical history on file.  No past surgical history on file.  acyclovir (ZOVIRAX) 400 MG tablet  bictegravir-emtricitabine-tenofovir (BIKTARVY) -25 MG per tablet  calcium carbonate (TUMS) 500 MG chewable tablet  FLUoxetine (PROZAC) 20 MG capsule  Magnesium Stearate POWD  naproxen (NAPROSYN) 250 MG tablet  omeprazole (PRILOSEC) 20 MG DR capsule      No Known Allergies  Family History  No family history on file.  Social History   Social History     Tobacco Use    Smoking status: Former     Current packs/day: 0.00     Types: Cigarettes     Start date: 2022     Quit date: 2023     Years since quittin.8     Passive exposure: Never    Smokeless tobacco: Never    Tobacco comments:     Did vaping for 2 years    Vaping Use    Vaping status: Former    Quit date: 2023    Substances: THC, Flavoring    Devices: Disposable   Substance Use Topics    Alcohol use: Not Currently    Drug use: Never      A medically appropriate review of systems was performed with pertinent positives and negatives noted in the HPI, and all other systems negative.    Physical Exam   BP: 139/78  Pulse: (!) 138  Temp: 100.4  F (38  C)  Resp: 18  SpO2: 99 %  Physical Exam  Constitutional:       General: He is not in acute distress.     Appearance:  He is not toxic-appearing.   HENT:      Head: Normocephalic and atraumatic.      Nose: Nose normal.      Mouth/Throat:      Mouth: Mucous membranes are moist.      Pharynx: Oropharynx is clear.   Eyes:      Conjunctiva/sclera: Conjunctivae normal.      Pupils: Pupils are equal, round, and reactive to light.   Cardiovascular:      Rate and Rhythm: Regular rhythm. Tachycardia present.      Heart sounds: No murmur heard.     No gallop.   Pulmonary:      Effort: Pulmonary effort is normal. No respiratory distress.      Breath sounds: No wheezing.   Musculoskeletal:         General: Normal range of motion.   Skin:     General: Skin is warm and dry.   Neurological:      General: No focal deficit present.      Mental Status: He is alert and oriented to person, place, and time.           ED Course, Procedures, & Data      Procedures            EKG Interpretation:      Interpreted by Kush Reaves MD  Time reviewed:    Symptoms at time of EKG: chest pain   Rhythm: normal sinus   Rate: tachycardia  Axis: normal  Ectopy: none  Conduction: normal  ST Segments/ T Waves: No ST-T wave changes  Q Waves: none    Clinical Impression: sinus tachycardia            Results for orders placed or performed during the hospital encounter of 04/26/25   Chest XR,  PA & LAT     Status: None    Narrative    XR CHEST 2 VIEWS  4/26/2025 4:14 PM     HISTORY:  chest pain, fever       COMPARISON:  None.     FINDINGS:   Lungs are clear. No pleural effusions. No pneumothorax. Cardiac  silhouette is normal.      Impression    IMPRESSION:  No acute cardiopulmonary findings.    I have personally reviewed the examination and initial interpretation  and I agree with the findings.    JUSTICE PUENTE DO         SYSTEM ID:  [id number]   Comprehensive metabolic panel     Status: Abnormal   Result Value Ref Range    Sodium 139 135 - 145 mmol/L    Potassium 3.8 3.4 - 5.3 mmol/L    Carbon Dioxide (CO2) 20 (L) 22 - 29 mmol/L    Anion Gap 22 (H) 7 - 15 mmol/L     Urea Nitrogen 12.1 6.0 - 20.0 mg/dL    Creatinine 0.73 0.67 - 1.17 mg/dL    GFR Estimate >90 >60 mL/min/1.73m2    Calcium 10.0 8.8 - 10.4 mg/dL    Chloride 97 (L) 98 - 107 mmol/L    Glucose 113 (H) 70 - 99 mg/dL    Alkaline Phosphatase 100 40 - 150 U/L    AST 30 0 - 45 U/L    ALT 21 0 - 70 U/L    Protein Total 8.1 6.4 - 8.3 g/dL    Albumin 5.2 3.5 - 5.2 g/dL    Bilirubin Total 1.2 <=1.2 mg/dL   Lipase     Status: Normal   Result Value Ref Range    Lipase 16 13 - 60 U/L   Influenza A/B, RSV and SARS-CoV2 PCR (COVID-19) Nasopharyngeal     Status: Normal    Specimen: Nasopharyngeal; Swab   Result Value Ref Range    Influenza A PCR Negative Negative    Influenza B PCR Negative Negative    RSV PCR Negative Negative    SARS CoV2 PCR Negative Negative    Narrative    Testing was performed using the Xpert Xpress CoV2/Flu/RSV Assay on the [employer] Instrument. This test should be ordered for the detection of SARS-CoV2, influenza, and RSV viruses in individuals with signs and symptoms of respiratory tract infection. This test is for in vitro diagnostic use under the [place] FDA for laboratories certified under CLIA to perform high or moderate complexity testing. This test has been [place] FDA cleared. A negative result does not rule out the presence of PCR inhibitors in the specimen or target RNA in concentration below the limit of detection for the assay. If only one viral target is positive but coinfection with multiple targets is suspected, the sample should be re-tested with another FDA cleared, approved, or authorized test, if coninfection would change clinical management. This test was validated by the [facility]. These laboratories are certified under the Clinical Laboratory Improvement Amendments of 1988 (CLIA-88) as qualified to perfom high complexity laboratory testing.   Troponin T, High Sensitivity     Status: Normal   Result Value Ref Range    Troponin T, High Sensitivity <6 <=22 ng/L   CBC with  platelets and differential     Status: None   Result Value Ref Range    WBC Count 10.1 4.0 - 11.0 10e3/uL    RBC Count 5.58 4.40 - 5.90 10e6/uL    Hemoglobin 17.4 13.3 - 17.7 g/dL    Hematocrit 50.1 40.0 - 53.0 %    MCV 90 78 - 100 fL    MCH 31.2 26.5 - 33.0 pg    MCHC 34.7 31.5 - 36.5 g/dL    RDW 13.3 10.0 - 15.0 %    Platelet Count 242 150 - 450 10e3/uL    % Neutrophils 74 %    % Lymphocytes 19 %    % Monocytes 7 %    % Eosinophils 0 %    % Basophils 1 %    % Immature Granulocytes 0 %    NRBCs per 100 WBC 0 <1 /100    Absolute Neutrophils 7.5 1.6 - 8.3 10e3/uL    Absolute Lymphocytes 1.9 0.8 - 5.3 10e3/uL    Absolute Monocytes 0.7 0.0 - 1.3 10e3/uL    Absolute Eosinophils 0.0 0.0 - 0.7 10e3/uL    Absolute Basophils 0.1 0.0 - 0.2 10e3/uL    Absolute Immature Granulocytes 0.0 <=0.4 10e3/uL    Absolute NRBCs 0.0 10e3/uL   EKG 12-lead, tracing only     Status: None (Preliminary result)   Result Value Ref Range    Systolic Blood Pressure  mmHg    Diastolic Blood Pressure  mmHg    Ventricular Rate 134 BPM    Atrial Rate 134 BPM    RI Interval 134 ms    QRS Duration 94 ms     ms    QTc 450 ms    P Axis 84 degrees    R AXIS 260 degrees    T Axis 54 degrees    Interpretation ECG       Sinus tachycardia  Right atrial enlargement  Right superior axis deviation  Pulmonary disease pattern  Inferior infarct , age undetermined  Abnormal ECG     EKG 12 lead     Status: None (Preliminary result)   Result Value Ref Range    Systolic Blood Pressure  mmHg    Diastolic Blood Pressure  mmHg    Ventricular Rate 120 BPM    Atrial Rate 120 BPM    RI Interval 134 ms    QRS Duration 96 ms     ms    QTc 477 ms    P Axis 74 degrees    R AXIS 255 degrees    T Axis 33 degrees    Interpretation ECG       Sinus tachycardia  Right superior axis deviation  Inferior infarct , age undetermined  Abnormal ECG     CBC with platelets differential     Status: None    Narrative    The following orders were created for panel order CBC with  platelets differential.  Procedure                               Abnormality         Status                     ---------                               -----------         ------                     CBC with platelets and ...[[id number]]                      Final result                 Please view results for these tests on the individual orders.     Medications   sodium chloride 0.9% BOLUS 1,000 mL (0 mLs Intravenous Stopped 4/26/25 1729)   ondansetron (ZOFRAN) injection 4 mg (4 mg Intravenous $Given 4/26/25 1621)   acetaminophen (TYLENOL) tablet 1,000 mg (1,000 mg Oral $Given 4/26/25 1648)     Labs Ordered and Resulted from Time of ED Arrival to Time of ED Departure   COMPREHENSIVE METABOLIC PANEL - Abnormal       Result Value    Sodium 139      Potassium 3.8      Carbon Dioxide (CO2) 20 (*)     Anion Gap 22 (*)     Urea Nitrogen 12.1      Creatinine 0.73      GFR Estimate >90      Calcium 10.0      Chloride 97 (*)     Glucose 113 (*)     Alkaline Phosphatase 100      AST 30      ALT 21      Protein Total 8.1      Albumin 5.2      Bilirubin Total 1.2     LIPASE - Normal    Lipase 16     INFLUENZA A/B, RSV AND SARS-COV2 PCR - Normal    Influenza A PCR Negative      Influenza B PCR Negative      RSV PCR Negative      SARS CoV2 PCR Negative     TROPONIN T, HIGH SENSITIVITY - Normal    Troponin T, High Sensitivity <6     CBC WITH PLATELETS AND DIFFERENTIAL    WBC Count 10.1      RBC Count 5.58      Hemoglobin 17.4      Hematocrit 50.1      MCV 90      MCH 31.2      MCHC 34.7      RDW 13.3      Platelet Count 242      % Neutrophils 74      % Lymphocytes 19      % Monocytes 7      % Eosinophils 0      % Basophils 1      % Immature Granulocytes 0      NRBCs per 100 WBC 0      Absolute Neutrophils 7.5      Absolute Lymphocytes 1.9      Absolute Monocytes 0.7      Absolute Eosinophils 0.0      Absolute Basophils 0.1      Absolute Immature Granulocytes 0.0      Absolute NRBCs 0.0       Chest XR,  PA & LAT   Final Result    IMPRESSION:   No acute cardiopulmonary findings.      I have personally reviewed the examination and initial interpretation   and I agree with the findings.      JUSTICE PUENTE DO            SYSTEM ID:  [id number]             Critical care was not performed.     Medical Decision Making  The patient's presentation was of moderate complexity (an undiagnosed new problem with uncertain prognosis).    The patient's evaluation involved:  review of external note(s) from 1 sources (clinic note)  review of 2 test result(s) ordered prior to this encounter (CBC, BMP)  ordering and/or review of 3+ test(s) in this encounter (see separate area of note for details)    The patient's management necessitated moderate risk (prescription drug management including medications given in the ED).    Assessment & Plan    This is a 30-year-old male presenting with fever, body aches, headache.  On arrival the patient was tachycardic and febrile.  Vitals were otherwise reassuring, he was overall well-appearing.  Symptoms were suggestive of potential viral etiologies.  A viral swab was obtained and negative for COVID, flu, RSV.    He had reported some intermittent chest pain and EKG was done which showed sinus tachycardia but no acute ischemic findings, troponin was undetectable.  Remainder his labs are simile reassuring.  CBC showed no leukocytosis.     There was chest pain a chest x-ray was also done and this came back negative.    Results were discussed with patient.  He was reevaluated and reported feeling significantly improved.  She reported decreased nausea, pain, was able to tolerate oral intake.  He felt well enough to manage symptoms at home.  Return precautions were discussed and all questions answered.    I have reviewed the nursing notes. I have reviewed the findings, diagnosis, plan and need for follow up with the patient.    Discharge Medication List as of 4/26/2025  7:32 PM          Final diagnoses:   Fever, unspecified fever  cause       Kush HUMPHRIES [facility] EMERGENCY DEPARTMENT  4/26/2025     Kush Reaves MD  04/26/25 1945

## 2025-04-27 LAB
ATRIAL RATE - MUSE: 120 BPM
ATRIAL RATE - MUSE: 134 BPM
DIASTOLIC BLOOD PRESSURE - MUSE: NORMAL MMHG
DIASTOLIC BLOOD PRESSURE - MUSE: NORMAL MMHG
INTERPRETATION ECG - MUSE: NORMAL
INTERPRETATION ECG - MUSE: NORMAL
P AXIS - MUSE: 74 DEGREES
P AXIS - MUSE: 84 DEGREES
PR INTERVAL - MUSE: 134 MS
PR INTERVAL - MUSE: 134 MS
QRS DURATION - MUSE: 94 MS
QRS DURATION - MUSE: 96 MS
QT - MUSE: 302 MS
QT - MUSE: 338 MS
QTC - MUSE: 450 MS
QTC - MUSE: 477 MS
R AXIS - MUSE: 255 DEGREES
R AXIS - MUSE: 260 DEGREES
SYSTOLIC BLOOD PRESSURE - MUSE: NORMAL MMHG
SYSTOLIC BLOOD PRESSURE - MUSE: NORMAL MMHG
T AXIS - MUSE: 33 DEGREES
T AXIS - MUSE: 54 DEGREES
VENTRICULAR RATE- MUSE: 120 BPM
VENTRICULAR RATE- MUSE: 134 BPM

## 2025-04-28 ENCOUNTER — PATIENT OUTREACH (OUTPATIENT)
Dept: FAMILY MEDICINE | Facility: CLINIC | Age: 30
End: 2025-04-28
Payer: COMMERCIAL

## 2025-04-28 NOTE — TELEPHONE ENCOUNTER
ED 4/26      Fever, unspecified fever cause- Nausea & Vomiting      Schedule an appointment with Davon Wilson DO (Family Medicine     Elina, RN    Triage Nurse  [facility]

## 2025-04-28 NOTE — TELEPHONE ENCOUNTER
Transitions of Care Outreach  Chief Complaint   Patient presents with    Hospital F/U       Most Recent Admission Date: 4/26/2025   Most Recent Admission Diagnosis:      Most Recent Discharge Date: 4/26/2025   Most Recent Discharge Diagnosis: Fever, unspecified fever cause - R50.9     Transitions of Care Assessment    Discharge Assessment  How are you doing now that you are home?: better  How are your symptoms? (Red Flag symptoms escalate to triage hotline per guidelines): Improved  Do you know how to contact your clinic care team if you have future questions or changes to your health status? : Yes  Does the patient have their discharge instructions? : Yes  Does the patient have questions regarding their discharge instructions? : No  Were you started on any new medications or were there changes to any of your previous medications? : No  Does the patient have all of their medications?: Yes  Do you have questions regarding any of your medications? : No  Do you have all of your needed medical supplies or equipment (DME)?  (i.e. oxygen tank, CPAP, cane, etc.): Yes    Follow up Plan     Discharge Follow-Up  Discharge follow up appointment scheduled in alignment with recommended follow up timeframe or Transitions of Risk Category? (Low = within 30 days; Moderate= within 14 days; High= within 7 days): No  Discharge Follow Up Appointment Scheduled with?: Specialty Care Provider  Patient's follow up appointment not scheduled: Patient declined scheduling support. Education on the importance of transitions of care follow up. Provided scheduling phone number.    No future appointments.    Outpatient Plan as outlined on AVS reviewed with patient.    He will follow up with Dr. Keith regarding this ED visit    For any urgent concerns, please contact our 24 hour nurse triage line: [phone] ([phone])       Rosalinda Cordova RN

## 2025-06-12 ENCOUNTER — MYC REFILL (OUTPATIENT)
Dept: FAMILY MEDICINE | Facility: CLINIC | Age: 30
End: 2025-06-12
Payer: COMMERCIAL

## 2025-06-12 DIAGNOSIS — B00.9 HERPES SIMPLEX VIRUS INFECTION: ICD-10-CM

## 2025-06-17 RX ORDER — ACYCLOVIR 400 MG/1
400 TABLET ORAL EVERY 8 HOURS
Qty: 15 TABLET | Refills: 0 | Status: SHIPPED | OUTPATIENT
Start: 2025-06-17